# Patient Record
Sex: MALE | Race: WHITE | Employment: FULL TIME | URBAN - METROPOLITAN AREA
[De-identification: names, ages, dates, MRNs, and addresses within clinical notes are randomized per-mention and may not be internally consistent; named-entity substitution may affect disease eponyms.]

---

## 2021-05-05 ENCOUNTER — HOSPITAL ENCOUNTER (OUTPATIENT)
Age: 51
Discharge: HOME | End: 2021-05-05
Attending: PHYSICIAN ASSISTANT
Payer: COMMERCIAL

## 2021-05-05 ENCOUNTER — APPOINTMENT (OUTPATIENT)
Dept: GENERAL RADIOLOGY | Age: 51
End: 2021-05-05
Attending: PHYSICIAN ASSISTANT
Payer: COMMERCIAL

## 2021-05-05 VITALS
HEIGHT: 71 IN | TEMPERATURE: 98 F | BODY MASS INDEX: 25.9 KG/M2 | RESPIRATION RATE: 18 BRPM | DIASTOLIC BLOOD PRESSURE: 82 MMHG | OXYGEN SATURATION: 99 % | WEIGHT: 185 LBS | HEART RATE: 87 BPM | SYSTOLIC BLOOD PRESSURE: 152 MMHG

## 2021-05-05 DIAGNOSIS — R03.0 ELEVATED BLOOD PRESSURE READING: ICD-10-CM

## 2021-05-05 DIAGNOSIS — S62.307A CLOSED DISPLACED FRACTURE OF FIFTH METACARPAL BONE OF LEFT HAND, UNSPECIFIED PORTION OF METACARPAL, INITIAL ENCOUNTER: Primary | ICD-10-CM

## 2021-05-05 PROCEDURE — 99203 OFFICE O/P NEW LOW 30 MIN: CPT

## 2021-05-05 PROCEDURE — 73130 X-RAY EXAM OF HAND: CPT | Performed by: PHYSICIAN ASSISTANT

## 2021-05-05 PROCEDURE — 29125 APPL SHORT ARM SPLINT STATIC: CPT

## 2021-05-05 RX ORDER — HYDROCODONE BITARTRATE AND ACETAMINOPHEN 5; 325 MG/1; MG/1
1 TABLET ORAL EVERY 6 HOURS PRN
Qty: 12 TABLET | Refills: 0 | Status: SHIPPED | OUTPATIENT
Start: 2021-05-05

## 2021-05-05 RX ORDER — IBUPROFEN 600 MG/1
TABLET ORAL
Qty: 20 TABLET | Refills: 0 | Status: SHIPPED | OUTPATIENT
Start: 2021-05-05

## 2021-05-05 NOTE — ED PROVIDER NOTES
Metacarpal  Patient Seen in: [facility]    History   Patient presents with:  Hand Pain    Stated Complaint: left hand possibly fractured/broken    HPI    HPI: Anitra Mix is a 49year old male who presents with chief complaint of left ibrahim is cooperative. Appears well-developed and well-nourished. Mild discomfort. Psychological: Alert, No abnormalities of mood, affect. Head: Normocephalic/atraumatic. Eyes: Pupils are equal round reactive to light. Conjunctiva are within normal limits. shaft of the left 5th metacarpal.     Dictated by (CST): Darshana Steinberg MD on 5/05/2021 at 2:36 PM     Finalized by (CST): Darshana Steinberg MD on 5/05/2021 at 2:38 PM            X-ray images of left hand reviewed by this provider–positive fracture of food  Qty: 20 tablet Refills: 0    HYDROcodone-acetaminophen 5-325 MG Oral Tab  Take 1 tablet by mouth every 6 (six) hours as needed for Pain (Severe pain).   Qty: 12 tablet Refills: 0  Comments: Masha Le MD

## 2021-05-05 NOTE — ED INITIAL ASSESSMENT (HPI)
Pt presents to the IC with c/o left hand pain since last night when he punched a door. +swelling and bruising. Ice applied last night. Medicated with motrin this morning.

## 2022-09-17 ENCOUNTER — HOSPITAL ENCOUNTER (OUTPATIENT)
Age: 52
Discharge: HOME | End: 2022-09-17
Attending: EMERGENCY MEDICINE

## 2022-09-17 VITALS
RESPIRATION RATE: 18 BRPM | TEMPERATURE: 98 F | SYSTOLIC BLOOD PRESSURE: 134 MMHG | OXYGEN SATURATION: 98 % | HEART RATE: 86 BPM | DIASTOLIC BLOOD PRESSURE: 84 MMHG

## 2022-09-17 DIAGNOSIS — T14.8XXA OPEN WOUND: Primary | ICD-10-CM

## 2022-09-17 PROCEDURE — 99213 OFFICE O/P EST LOW 20 MIN: CPT

## 2022-09-17 RX ORDER — CEPHALEXIN 500 MG/1
500 CAPSULE ORAL 3 TIMES DAILY
Qty: 21 CAPSULE | Refills: 0 | Status: SHIPPED | OUTPATIENT
Start: 2022-09-17 | End: 2022-09-27

## 2022-09-17 NOTE — ED INITIAL ASSESSMENT (HPI)
Pt comes in for eval of laceration to L knee. Reports laceration happened and stitched up on 9/1. Stitches removed this past Thursday. Pt comes in for eval as would edges not fully healed. Denies fever, drainage.

## 2024-07-24 ENCOUNTER — HOSPITAL ENCOUNTER (OUTPATIENT)
Age: 54
Discharge: HOME | End: 2024-07-24
Payer: MEDICAID

## 2024-07-24 VITALS
SYSTOLIC BLOOD PRESSURE: 128 MMHG | TEMPERATURE: 98 F | HEART RATE: 70 BPM | RESPIRATION RATE: 22 BRPM | DIASTOLIC BLOOD PRESSURE: 86 MMHG | OXYGEN SATURATION: 99 %

## 2024-07-24 DIAGNOSIS — M79.622 LEFT UPPER ARM PAIN: Primary | ICD-10-CM

## 2024-07-24 PROCEDURE — 99213 OFFICE O/P EST LOW 20 MIN: CPT

## 2024-07-24 PROCEDURE — 99212 OFFICE O/P EST SF 10 MIN: CPT

## 2024-07-24 NOTE — ED INITIAL ASSESSMENT (HPI)
Pt states 2 weeks ago he was moving a heavy desk when he felt a pop and his left bicep balled up. Pt states he was seen at a different urgent care where they did xrays which were fine. Pt states this weekend he was fishing with his kids and when he went to set the hook his bicep balled up again and is causing pain.

## 2024-07-24 NOTE — ED PROVIDER NOTES
Patient Seen in: [facility]      History     Chief Complaint   Patient presents with    Arm or Hand Injury     Stated Complaint: arm injury    Subjective:   HPI    This is a 54-year-old male presenting with a arm injury.  Patient states 2 weeks ago he was moving a heavy desk and felt a pop in his left bicep.  Patient states a few days after that he went to a different urgent care they did x-rays and no injury to the bone.  Patient states that it was doing okay and then this weekend went fishing with the kids and went to set the hook and felt pain and the left biceps but no pop.  Patient states he is able to move it there is pain when he fully extends.  Patient states he was given a sling at the other immediate care that he was at but has not really been wearing it.  Denies any numbness or tingling in the extremity.  Denies falling on the upper extremity.  Denies any swelling.  Right-hand-dominant    Objective:   No pertinent past medical history.            No pertinent past surgical history.              No pertinent social history.            Review of Systems    Positive for stated Chief Complaint: Arm or Hand Injury    Other systems are as noted in HPI.  Constitutional and vital signs reviewed.      All other systems reviewed and negative except as noted above.    Physical Exam     ED Triage Vitals [07/24/24 1050]   /86   Pulse 70   Resp 22   Temp 97.8 °F (36.6 °C)   Temp src Temporal   SpO2 99 %   O2 Device None (Room air)       Current Vitals:   Vital Signs  BP: 128/86  Pulse: 70  Resp: 22  Temp: 97.8 °F (36.6 °C)  Temp src: Temporal    Oxygen Therapy  SpO2: 99 %  O2 Device: None (Room air)            Physical Exam  Vitals and nursing note reviewed.   Constitutional:       Appearance: Normal appearance.   HENT:      Right Ear: External ear normal.      Left Ear: External ear normal.      Nose: Nose normal.      Mouth/Throat:      Mouth: Mucous membranes are moist.      Pharynx: Oropharynx  is clear.   Eyes:      Conjunctiva/sclera: Conjunctivae normal.   Musculoskeletal:         General: Normal range of motion.        Arms:       Cervical back: Normal range of motion.   Skin:     General: Skin is warm.      Capillary Refill: Capillary refill takes less than 2 seconds.   Neurological:      General: No focal deficit present.      Mental Status: He is alert and oriented to person, place, and time.               ED Course   Labs Reviewed - No data to display               MDM                                Medical Decision Making  54-year-old male well-appearing and nontoxic presenting with left biceps injury.  DDx biceps rupture versus biceps tear versus biceps strain.  No clinical indication for any labs or imaging as he did not fall on the extremity.  Discussed possible diagnosis with the patient and following up with Ortho discussed wearing the sling that he was given at the other immediate care center for comfort discussed RICE therapy over-the-counter ibuprofen and Tylenol for pain and ER precautions with any new or worsening symptoms.  Patient feels comfortable with the plan of care and acknowledges understanding discharge instructions.     Risk  OTC drugs.        Disposition and Plan     Clinical Impression:  1. Left upper arm pain         Disposition:  Discharge  7/24/2024 11:05 am    Follow-up:  Karlee Cain PA-C  [address] [place] [id number]  [phone]    Call   Resource for orthopedic specialist to follow-up with call take first available appointment          Medications Prescribed:  Discharge Medication List as of 7/24/2024 11:05 AM

## 2024-07-24 NOTE — DISCHARGE INSTRUCTIONS
Wear the sling that you have at home throughout the day for comfort no heavy lifting pushing or pulling or abrupt movements ice pack to the area 2-3 times per day inside of a pillowcase or cloth take over-the-counter ibuprofen and Tylenol for pain.  If you develop severe swelling inability to move the upper extremity fingertips are blue and not pink cannot feel sensation of you are someone else is touching the fingertips or any new or worsening symptoms go to the nearest emergency department.

## 2024-07-29 ENCOUNTER — OFFICE VISIT (OUTPATIENT)
Dept: ORTHOPEDICS CLINIC | Facility: CLINIC | Age: 54
End: 2024-07-29

## 2024-07-29 ENCOUNTER — TELEPHONE (OUTPATIENT)
Dept: ORTHOPEDICS CLINIC | Facility: CLINIC | Age: 54
End: 2024-07-29

## 2024-07-29 DIAGNOSIS — S46.212A RUPTURE OF LEFT DISTAL BICEPS TENDON, INITIAL ENCOUNTER: Primary | ICD-10-CM

## 2024-07-29 RX ORDER — METHOCARBAMOL 500 MG/1
500 TABLET, FILM COATED ORAL 3 TIMES DAILY PRN
Qty: 30 TABLET | Refills: 0 | Status: SHIPPED | OUTPATIENT
Start: 2024-07-29

## 2024-07-29 NOTE — H&P (VIEW-ONLY)
Orthopaedic Surgery New Patient Visit  _____________________________________________________________________________________________________  _____________________________________________________________________________________________________    DATE OF VISIT: 7/29/2024     CHIEF COMPLAINT:   Chief Complaint   Patient presents with    Arm Pain     Consult  Left arm bicep pain 0-10/10 worse on certain movement. Onset 2 weeks  he move a heavy desk, he heard a pop sound. He was resting his arm, but a 2nd injury was on 07/21/24 he was fishing and did a sudden movement witch cause severe pain.        HISTORY OF PRESENT ILLNESS: Charbel Olvera is a 54 year old male who presents to the clinic for a left biceps injury.  He reports that he was lifting a heavy desk approximately 2 weeks ago when he heard a pop and had substantial amount of pain in his arm about the antecubital fossa.  He noticed immediate deformity about the distal biceps.  He then reports that he had a repeat injury while fishing, resulting in another painful pop in the brought him to his knees.  Since that time, he has had diminished function in his left upper extremity and a notable deformity.  He denies any neurologic symptoms.  He reports the pain varies between 0 out of 10 at rest to 10 out of 10 with certain movements.  Pain is always in the antecubital fossa and distal biceps region.  He denies any other major injuries.  He denies any antecedent symptoms.  He denies any history of urinary tract infection treated with antibiotics.  He is right-hand dominant    SOCIAL HISTORY  Social History     Socioeconomic History    Marital status:      Spouse name: Not on file    Number of children: Not on file    Years of education: Not on file    Highest education level: Not on file   Occupational History    Not on file   Tobacco Use    Smoking status: Never    Smokeless tobacco: Never   Substance and Sexual Activity    Alcohol use: Not on file    Drug  use: Not on file    Sexual activity: Not on file   Other Topics Concern    Not on file   Social History Narrative    Not on file     Social Determinants of Health     Financial Resource Strain: Not on file   Food Insecurity: Not on file   Transportation Needs: Not on file   Physical Activity: Not on file   Stress: Not on file   Social Connections: Not on file   Housing Stability: At Risk (8/18/2023)    Received from [facility]     Living Situation: Not on file     Housing Problems: Not on file        PAST MEDICAL HISTORY  History reviewed. No pertinent past medical history.     PAST SURGICAL HISTORY  History reviewed. No pertinent surgical history.     MEDICATIONS  * Reviewed   Acetaminophen 500 MG Oral Cap Take 2 capsules (1,000 mg total) by mouth every 8 (eight) hours as needed for Pain. Never exceed 3000 mg in a 24-hour period.  Many over-the-counter medications also have acetaminophen in them. 60 capsule 0    methocarbamol 500 MG Oral Tab Take 1 tablet (500 mg total) by mouth 3 (three) times daily as needed (Muscle pain). 30 tablet 0        ALLERGIES  No Known Allergies     FAMILY HISTORY  History reviewed. No pertinent family history.     REVIEW OF SYSTEMS  A 14 point review of systems was performed. Pertinent positives and negatives noted in the HPI.    PHYSICAL EXAM  There were no vitals taken for this visit.     Constitutional: The patient is well-developed, well-nourished, in no acute distress.  Neurological: Alert and oriented to person, place, and time.  Psychiatric: Mood and affect normal.  Head: Normocephalic and atraumatic.  Cardiovascular: regular rate by palpation  Pulmonary/Chest: Effort normal. No respiratory distress. Breathing non-labored  Abdominal: Abdomen exhibits no distension.   Left  Elbow  Inspection/Palpation  Visible Tito deformity  Mild ecchymosis in antecubital fossa  Mild effusion   Focally tender to palpation to antecubital fossa  ROM  Elbow Flexion: 140  degrees  Elbow Extension: 0 degrees  Pronation: 80 degrees  Supination: 80 degree  Motor/Strength  Motor intact to AIN/PIN/U distributions  Flexion: 4/5  Extension: 5/5  Pronation: 5/5  Supination: 4/5  Stability  Stable to varus stress   Negative posterolateral rotary drawer  Able to push-up from chair  Stable to valgus stress  Negative milking maneuver  Negative moving valgus stress test  POSITIVE Tests  Biceps:  Abnormal Hook test and Absent Biceps squeeze  NEGATIVE Tests  Cubital Tunnel: Ulnar nerve subluxation and Froment sign  SILT R/U/M/MABCN/LABCN  Radial pulse 2+, distally warm and well perfused, brisk capillary refill      RESULTS    No results found for: \"WBC\", \"HGB\", \"PLT\"   No results found for: \"GLU\", \"BUN\", \"CREATSERUM\", \"GFR\", \"GFRNAA\", \"GFRAA\"     IMAGING  I independently viewed and interpreted the imaging. Radiologist interpretation is available in the imaging report.  X-ray: Plain films of the left elbow including AP, lateral, and oblique views were reviewed. These demonstrate no acute ossoues abnormalities. The ulnohumeral and radiocapitellar joints are well aligned with minimal to no degenerative changes appreciated.     ASSESSMENT/PLAN: Charbel Olvera is a 54 year old male who presents to the Orthopaedic surgery clinic today for left arm injury. Based on history, physical exam, and available imaging, the patient diagnosis is consistent with distal biceps rupture. The management options for this process were discussed with the patient to include conservative and surgical options. Given the patient's age, functional status, and findings, I recommend repair.      - General risks of surgery reviewed including risks of pain, bleeding, infection, scarring, damage to surrounding structures, need for further procedures, blood transfusion, VTE, risks of anesthesia, failure to improve symptoms, and recurrence of disease.    -  Patient will be placed on ERAS pathway. Written and verbal pre-op  instructions given, including information regarding pre-op diet, utilization of chlorhexidine, and expectations for post-op activity. Discussed expected course of recovery and post-op activity restrictions.  - Discussed discharge pathway.  - Discussed postoperative pain management and expectation that patient may require narcotic medication as an adjunct to multi-modal analgesic therapy.  - All questions were answered and patient desires to proceed.   - Plan to sign consents on the day of surgery.   - Preoperative testing needed:  None  - See below for specific surgical planning details:      Surgical Planning:  Procedure: Left distal biceps repair  PMH considerations: Noncontributory  PSH considerations: Noncontributory  Drug Allergies: NKDA  Anesthesia issues:  no FHx or personal problems with anesthesia  Planned positioning: Supine with hand table  Bleeding Issues:  no personal of FHx of bleeding or clotting problems   Pre-op consultations needed: None  Additional pre-op imaging needed: None  Counseled for smoking cessation to improve OR outcome: N/A  Special equipment needed in the operating room: Distal biceps repair set, mini C arm  Post-op pain regimen:  icing, elevation, Tylenol, celebrex, gabapentin, methocarbamol, oxyodone  Informed Consent: to be signed on day of surgery  People to be present: Spouse  Person to make decisions if patient is incapacitated: Spouse    I have personally seen Charbel Olvera and discussed in detail their plan of care. Prior to departure, they indicated agreement with and understanding of their plan of care and their follow-up as documented herein this note. Please note that this note was written in combination with voice recognition/dictation software and there is a possibility of transcription errors which were not identified at the time of note submission. If clarification is necessary, please contact the author or clinic staff.    Jean-Paul Dc MD  Orthopaedic  Surgery  7/29/2024      Rationale for 57 Modifier Addendum    Decision for Major Surgery  The decision for a major surgery was made during this visit/consultation based on review and discussion of the history of presentation, examination, available labs/imaging, and the patient's functional status. The medical and surgical history were considered with regards to risk and potential modifications needed.

## 2024-07-29 NOTE — TELEPHONE ENCOUNTER
Ortho Surgery Request  Surgery: Left distal biceps repair      Surgical Assistant: Malik Tucker  Surgery Day Request: 14, 15, or 16 August in the afternoon  Estimated Procedure Length: 1 hour  Anesthesia type: General   Position: Supine with hand table   Special Needs:[x]Mini C-Arm    Equipment: Arthrex biceps anchors   Location:[facility]  Post Op Visit Date: 2 weeks  CPT Code: 51066  ICD Code:S46.212  Medical Clearance Needed: NA  Preadmission Testing Orders:  Anesthesia testing protocols and anesthesia pre op orders will be used  Additional PAT orders:  Pre OP Orders:  Use [facility] procedure driven order set in addition to anesthesia protocol  Additional Pre Op Orders:  PCN Allergy:  No  If Yes:   __X__  Admit:  Hospital outpatient surgery

## 2024-07-29 NOTE — PROGRESS NOTES
Orthopaedic Surgery New Patient Visit  _____________________________________________________________________________________________________  _____________________________________________________________________________________________________    DATE OF VISIT: 7/29/2024     CHIEF COMPLAINT:   Chief Complaint   Patient presents with    Arm Pain     Consult  Left arm bicep pain 0-10/10 worse on certain movement. Onset 2 weeks  he move a heavy desk, he heard a pop sound. He was resting his arm, but a 2nd injury was on 07/21/24 he was fishing and did a sudden movement witch cause severe pain.        HISTORY OF PRESENT ILLNESS: Charbel Olvera is a 54 year old male who presents to the clinic for a left biceps injury.  He reports that he was lifting a heavy desk approximately 2 weeks ago when he heard a pop and had substantial amount of pain in his arm about the antecubital fossa.  He noticed immediate deformity about the distal biceps.  He then reports that he had a repeat injury while fishing, resulting in another painful pop in the brought him to his knees.  Since that time, he has had diminished function in his left upper extremity and a notable deformity.  He denies any neurologic symptoms.  He reports the pain varies between 0 out of 10 at rest to 10 out of 10 with certain movements.  Pain is always in the antecubital fossa and distal biceps region.  He denies any other major injuries.  He denies any antecedent symptoms.  He denies any history of urinary tract infection treated with antibiotics.  He is right-hand dominant    SOCIAL HISTORY  Social History     Socioeconomic History    Marital status:      Spouse name: Not on file    Number of children: Not on file    Years of education: Not on file    Highest education level: Not on file   Occupational History    Not on file   Tobacco Use    Smoking status: Never    Smokeless tobacco: Never   Substance and Sexual Activity    Alcohol use: Not on file    Drug  use: Not on file    Sexual activity: Not on file   Other Topics Concern    Not on file   Social History Narrative    Not on file     Social Determinants of Health     Financial Resource Strain: Not on file   Food Insecurity: Not on file   Transportation Needs: Not on file   Physical Activity: Not on file   Stress: Not on file   Social Connections: Not on file   Housing Stability: At Risk (8/18/2023)    Received from [facility]     Living Situation: Not on file     Housing Problems: Not on file        PAST MEDICAL HISTORY  History reviewed. No pertinent past medical history.     PAST SURGICAL HISTORY  History reviewed. No pertinent surgical history.     MEDICATIONS  * Reviewed   Acetaminophen 500 MG Oral Cap Take 2 capsules (1,000 mg total) by mouth every 8 (eight) hours as needed for Pain. Never exceed 3000 mg in a 24-hour period.  Many over-the-counter medications also have acetaminophen in them. 60 capsule 0    methocarbamol 500 MG Oral Tab Take 1 tablet (500 mg total) by mouth 3 (three) times daily as needed (Muscle pain). 30 tablet 0        ALLERGIES  No Known Allergies     FAMILY HISTORY  History reviewed. No pertinent family history.     REVIEW OF SYSTEMS  A 14 point review of systems was performed. Pertinent positives and negatives noted in the HPI.    PHYSICAL EXAM  There were no vitals taken for this visit.     Constitutional: The patient is well-developed, well-nourished, in no acute distress.  Neurological: Alert and oriented to person, place, and time.  Psychiatric: Mood and affect normal.  Head: Normocephalic and atraumatic.  Cardiovascular: regular rate by palpation  Pulmonary/Chest: Effort normal. No respiratory distress. Breathing non-labored  Abdominal: Abdomen exhibits no distension.   Left  Elbow  Inspection/Palpation  Visible Tito deformity  Mild ecchymosis in antecubital fossa  Mild effusion   Focally tender to palpation to antecubital fossa  ROM  Elbow Flexion: 140  degrees  Elbow Extension: 0 degrees  Pronation: 80 degrees  Supination: 80 degree  Motor/Strength  Motor intact to AIN/PIN/U distributions  Flexion: 4/5  Extension: 5/5  Pronation: 5/5  Supination: 4/5  Stability  Stable to varus stress   Negative posterolateral rotary drawer  Able to push-up from chair  Stable to valgus stress  Negative milking maneuver  Negative moving valgus stress test  POSITIVE Tests  Biceps:  Abnormal Hook test and Absent Biceps squeeze  NEGATIVE Tests  Cubital Tunnel: Ulnar nerve subluxation and Froment sign  SILT R/U/M/MABCN/LABCN  Radial pulse 2+, distally warm and well perfused, brisk capillary refill      RESULTS    No results found for: \"WBC\", \"HGB\", \"PLT\"   No results found for: \"GLU\", \"BUN\", \"CREATSERUM\", \"GFR\", \"GFRNAA\", \"GFRAA\"     IMAGING  I independently viewed and interpreted the imaging. Radiologist interpretation is available in the imaging report.  X-ray: Plain films of the left elbow including AP, lateral, and oblique views were reviewed. These demonstrate no acute ossoues abnormalities. The ulnohumeral and radiocapitellar joints are well aligned with minimal to no degenerative changes appreciated.     ASSESSMENT/PLAN: Charbel Olvera is a 54 year old male who presents to the Orthopaedic surgery clinic today for left arm injury. Based on history, physical exam, and available imaging, the patient diagnosis is consistent with distal biceps rupture. The management options for this process were discussed with the patient to include conservative and surgical options. Given the patient's age, functional status, and findings, I recommend repair.      - General risks of surgery reviewed including risks of pain, bleeding, infection, scarring, damage to surrounding structures, need for further procedures, blood transfusion, VTE, risks of anesthesia, failure to improve symptoms, and recurrence of disease.    -  Patient will be placed on ERAS pathway. Written and verbal pre-op  instructions given, including information regarding pre-op diet, utilization of chlorhexidine, and expectations for post-op activity. Discussed expected course of recovery and post-op activity restrictions.  - Discussed discharge pathway.  - Discussed postoperative pain management and expectation that patient may require narcotic medication as an adjunct to multi-modal analgesic therapy.  - All questions were answered and patient desires to proceed.   - Plan to sign consents on the day of surgery.   - Preoperative testing needed:  None  - See below for specific surgical planning details:      Surgical Planning:  Procedure: Left distal biceps repair  PMH considerations: Noncontributory  PSH considerations: Noncontributory  Drug Allergies: NKDA  Anesthesia issues:  no FHx or personal problems with anesthesia  Planned positioning: Supine with hand table  Bleeding Issues:  no personal of FHx of bleeding or clotting problems   Pre-op consultations needed: None  Additional pre-op imaging needed: None  Counseled for smoking cessation to improve OR outcome: N/A  Special equipment needed in the operating room: Distal biceps repair set, mini C arm  Post-op pain regimen:  icing, elevation, Tylenol, celebrex, gabapentin, methocarbamol, oxyodone  Informed Consent: to be signed on day of surgery  People to be present: Spouse  Person to make decisions if patient is incapacitated: Spouse    I have personally seen Charbel Olvera and discussed in detail their plan of care. Prior to departure, they indicated agreement with and understanding of their plan of care and their follow-up as documented herein this note. Please note that this note was written in combination with voice recognition/dictation software and there is a possibility of transcription errors which were not identified at the time of note submission. If clarification is necessary, please contact the author or clinic staff.    Jean-Paul Dc MD  Orthopaedic  Surgery  7/29/2024      Rationale for 57 Modifier Addendum    Decision for Major Surgery  The decision for a major surgery was made during this visit/consultation based on review and discussion of the history of presentation, examination, available labs/imaging, and the patient's functional status. The medical and surgical history were considered with regards to risk and potential modifications needed.

## 2024-07-29 NOTE — TELEPHONE ENCOUNTER
Type of surgery:  Left Distal Biceps Repair  Date: 8/15/24  Location: [facility]  Medical Clearance: No     *Medical:      *Dental:      *Other:  Prior Authorization Status: Pending   Workers Comp:  Medacta/Jesus Manuel:  [place]: Yes  POV: 8/28/24

## 2024-07-29 NOTE — TELEPHONE ENCOUNTER
Spoke with patient to inform him that his surgery with Dr. Dc will be scheduled for 8/15. Patient agreed to this surgical date.

## 2024-07-29 NOTE — TELEPHONE ENCOUNTER
Called Pt. Regarding left arm X-rays. NO answer left detailed message, If patient has any Xrays, MRI or discs pertaining to his left arm to bring them in. We will be putting in an order if he does not have any Xrays.

## 2024-08-15 ENCOUNTER — ANESTHESIA EVENT (OUTPATIENT)
Dept: SURGERY | Facility: HOSPITAL | Age: 54
End: 2024-08-15
Payer: MEDICAID

## 2024-08-15 ENCOUNTER — HOSPITAL ENCOUNTER (OUTPATIENT)
Facility: HOSPITAL | Age: 54
Discharge: HOME | End: 2024-08-15
Attending: STUDENT IN AN ORGANIZED HEALTH CARE EDUCATION/TRAINING PROGRAM | Admitting: STUDENT IN AN ORGANIZED HEALTH CARE EDUCATION/TRAINING PROGRAM
Payer: MEDICAID

## 2024-08-15 ENCOUNTER — ANESTHESIA (OUTPATIENT)
Dept: SURGERY | Facility: HOSPITAL | Age: 54
End: 2024-08-15
Payer: MEDICAID

## 2024-08-15 ENCOUNTER — APPOINTMENT (OUTPATIENT)
Dept: GENERAL RADIOLOGY | Facility: HOSPITAL | Age: 54
End: 2024-08-15
Attending: STUDENT IN AN ORGANIZED HEALTH CARE EDUCATION/TRAINING PROGRAM
Payer: MEDICAID

## 2024-08-15 VITALS
TEMPERATURE: 97 F | SYSTOLIC BLOOD PRESSURE: 140 MMHG | RESPIRATION RATE: 14 BRPM | HEIGHT: 71 IN | DIASTOLIC BLOOD PRESSURE: 80 MMHG | HEART RATE: 56 BPM | OXYGEN SATURATION: 98 % | BODY MASS INDEX: 24.5 KG/M2 | WEIGHT: 175 LBS

## 2024-08-15 DIAGNOSIS — S46.212A RUPTURE OF LEFT DISTAL BICEPS TENDON, INITIAL ENCOUNTER: Primary | ICD-10-CM

## 2024-08-15 PROCEDURE — 24342 REPAIR OF RUPTURED TENDON: CPT | Performed by: STUDENT IN AN ORGANIZED HEALTH CARE EDUCATION/TRAINING PROGRAM

## 2024-08-15 PROCEDURE — 76000 FLUOROSCOPY <1 HR PHYS/QHP: CPT | Performed by: STUDENT IN AN ORGANIZED HEALTH CARE EDUCATION/TRAINING PROGRAM

## 2024-08-15 PROCEDURE — 0LM40ZZ REATTACHMENT OF LEFT UPPER ARM TENDON, OPEN APPROACH: ICD-10-PCS | Performed by: STUDENT IN AN ORGANIZED HEALTH CARE EDUCATION/TRAINING PROGRAM

## 2024-08-15 DEVICE — FIBERTAK BICEPS IMPLANT SET
Type: IMPLANTABLE DEVICE | Site: ARM | Status: FUNCTIONAL
Brand: ARTHREX®

## 2024-08-15 DEVICE — IMPLANT SYSTEM, FIBERTAK BUTTON
Type: IMPLANTABLE DEVICE | Site: ARM | Status: FUNCTIONAL
Brand: ARTHREX®

## 2024-08-15 RX ORDER — ONDANSETRON 2 MG/ML
INJECTION INTRAMUSCULAR; INTRAVENOUS AS NEEDED
Status: DISCONTINUED | OUTPATIENT
Start: 2024-08-15 | End: 2024-08-15 | Stop reason: SURG

## 2024-08-15 RX ORDER — MORPHINE SULFATE 10 MG/ML
6 INJECTION, SOLUTION INTRAMUSCULAR; INTRAVENOUS EVERY 10 MIN PRN
Status: DISCONTINUED | OUTPATIENT
Start: 2024-08-15 | End: 2024-08-15

## 2024-08-15 RX ORDER — HYDROMORPHONE HYDROCHLORIDE 1 MG/ML
0.4 INJECTION, SOLUTION INTRAMUSCULAR; INTRAVENOUS; SUBCUTANEOUS EVERY 2 HOUR PRN
Status: DISCONTINUED | OUTPATIENT
Start: 2024-08-15 | End: 2024-08-15

## 2024-08-15 RX ORDER — ONDANSETRON 4 MG/1
4 TABLET, ORALLY DISINTEGRATING ORAL EVERY 8 HOURS PRN
Qty: 10 TABLET | Refills: 0 | Status: SHIPPED | OUTPATIENT
Start: 2024-08-15

## 2024-08-15 RX ORDER — METHOCARBAMOL 750 MG/1
750 TABLET, FILM COATED ORAL 3 TIMES DAILY PRN
Qty: 42 TABLET | Refills: 0 | Status: SHIPPED | OUTPATIENT
Start: 2024-08-15

## 2024-08-15 RX ORDER — HYDROMORPHONE HYDROCHLORIDE 1 MG/ML
0.6 INJECTION, SOLUTION INTRAMUSCULAR; INTRAVENOUS; SUBCUTANEOUS EVERY 5 MIN PRN
Status: DISCONTINUED | OUTPATIENT
Start: 2024-08-15 | End: 2024-08-15

## 2024-08-15 RX ORDER — MORPHINE SULFATE 4 MG/ML
4 INJECTION, SOLUTION INTRAMUSCULAR; INTRAVENOUS EVERY 10 MIN PRN
Status: DISCONTINUED | OUTPATIENT
Start: 2024-08-15 | End: 2024-08-15

## 2024-08-15 RX ORDER — ROCURONIUM BROMIDE 10 MG/ML
INJECTION, SOLUTION INTRAVENOUS AS NEEDED
Status: DISCONTINUED | OUTPATIENT
Start: 2024-08-15 | End: 2024-08-15 | Stop reason: SURG

## 2024-08-15 RX ORDER — ONDANSETRON 2 MG/ML
4 INJECTION INTRAMUSCULAR; INTRAVENOUS EVERY 6 HOURS PRN
Status: DISCONTINUED | OUTPATIENT
Start: 2024-08-15 | End: 2024-08-15

## 2024-08-15 RX ORDER — HYDROMORPHONE HYDROCHLORIDE 1 MG/ML
0.4 INJECTION, SOLUTION INTRAMUSCULAR; INTRAVENOUS; SUBCUTANEOUS EVERY 5 MIN PRN
Status: DISCONTINUED | OUTPATIENT
Start: 2024-08-15 | End: 2024-08-15

## 2024-08-15 RX ORDER — SODIUM CHLORIDE, SODIUM LACTATE, POTASSIUM CHLORIDE, CALCIUM CHLORIDE 600; 310; 30; 20 MG/100ML; MG/100ML; MG/100ML; MG/100ML
INJECTION, SOLUTION INTRAVENOUS CONTINUOUS
Status: DISCONTINUED | OUTPATIENT
Start: 2024-08-15 | End: 2024-08-15

## 2024-08-15 RX ORDER — ACETAMINOPHEN 500 MG
1000 TABLET ORAL ONCE
Status: COMPLETED | OUTPATIENT
Start: 2024-08-15 | End: 2024-08-15

## 2024-08-15 RX ORDER — HYDROMORPHONE HYDROCHLORIDE 1 MG/ML
0.2 INJECTION, SOLUTION INTRAMUSCULAR; INTRAVENOUS; SUBCUTANEOUS EVERY 5 MIN PRN
Status: DISCONTINUED | OUTPATIENT
Start: 2024-08-15 | End: 2024-08-15

## 2024-08-15 RX ORDER — ACETAMINOPHEN 500 MG
1000 TABLET ORAL EVERY 8 HOURS SCHEDULED
Status: DISCONTINUED | OUTPATIENT
Start: 2024-08-15 | End: 2024-08-15

## 2024-08-15 RX ORDER — MORPHINE SULFATE 4 MG/ML
2 INJECTION, SOLUTION INTRAMUSCULAR; INTRAVENOUS EVERY 10 MIN PRN
Status: DISCONTINUED | OUTPATIENT
Start: 2024-08-15 | End: 2024-08-15

## 2024-08-15 RX ORDER — OXYCODONE HYDROCHLORIDE 5 MG/1
10 TABLET ORAL EVERY 4 HOURS PRN
Status: DISCONTINUED | OUTPATIENT
Start: 2024-08-15 | End: 2024-08-15

## 2024-08-15 RX ORDER — HYDROMORPHONE HYDROCHLORIDE 1 MG/ML
0.8 INJECTION, SOLUTION INTRAMUSCULAR; INTRAVENOUS; SUBCUTANEOUS EVERY 2 HOUR PRN
Status: DISCONTINUED | OUTPATIENT
Start: 2024-08-15 | End: 2024-08-15

## 2024-08-15 RX ORDER — OXYCODONE HYDROCHLORIDE 5 MG/1
5 TABLET ORAL EVERY 4 HOURS PRN
Qty: 12 TABLET | Refills: 0 | Status: SHIPPED | OUTPATIENT
Start: 2024-08-15

## 2024-08-15 RX ORDER — CELECOXIB 200 MG/1
200 CAPSULE ORAL 2 TIMES DAILY
Status: DISCONTINUED | OUTPATIENT
Start: 2024-08-15 | End: 2024-08-15

## 2024-08-15 RX ORDER — LIDOCAINE HYDROCHLORIDE 10 MG/ML
INJECTION, SOLUTION EPIDURAL; INFILTRATION; INTRACAUDAL; PERINEURAL AS NEEDED
Status: DISCONTINUED | OUTPATIENT
Start: 2024-08-15 | End: 2024-08-15 | Stop reason: SURG

## 2024-08-15 RX ORDER — HYDROMORPHONE HYDROCHLORIDE 1 MG/ML
INJECTION, SOLUTION INTRAMUSCULAR; INTRAVENOUS; SUBCUTANEOUS AS NEEDED
Status: DISCONTINUED | OUTPATIENT
Start: 2024-08-15 | End: 2024-08-15 | Stop reason: SURG

## 2024-08-15 RX ORDER — NALOXONE HYDROCHLORIDE 0.4 MG/ML
0.08 INJECTION, SOLUTION INTRAMUSCULAR; INTRAVENOUS; SUBCUTANEOUS AS NEEDED
Status: DISCONTINUED | OUTPATIENT
Start: 2024-08-15 | End: 2024-08-15

## 2024-08-15 RX ORDER — GABAPENTIN 300 MG/1
300 CAPSULE ORAL EVERY 8 HOURS
Qty: 30 CAPSULE | Refills: 0 | Status: SHIPPED | OUTPATIENT
Start: 2024-08-15

## 2024-08-15 RX ORDER — CELECOXIB 200 MG/1
200 CAPSULE ORAL 2 TIMES DAILY
Qty: 28 CAPSULE | Refills: 0 | Status: SHIPPED | OUTPATIENT
Start: 2024-08-15

## 2024-08-15 RX ORDER — OXYCODONE HYDROCHLORIDE 5 MG/1
5 TABLET ORAL EVERY 4 HOURS PRN
Status: DISCONTINUED | OUTPATIENT
Start: 2024-08-15 | End: 2024-08-15

## 2024-08-15 RX ORDER — BUPIVACAINE HYDROCHLORIDE AND EPINEPHRINE 5; 5 MG/ML; UG/ML
INJECTION, SOLUTION PERINEURAL AS NEEDED
Status: DISCONTINUED | OUTPATIENT
Start: 2024-08-15 | End: 2024-08-15 | Stop reason: HOSPADM

## 2024-08-15 RX ORDER — DEXAMETHASONE SODIUM PHOSPHATE 4 MG/ML
VIAL (ML) INJECTION AS NEEDED
Status: DISCONTINUED | OUTPATIENT
Start: 2024-08-15 | End: 2024-08-15 | Stop reason: SURG

## 2024-08-15 RX ORDER — SENNA AND DOCUSATE SODIUM 50; 8.6 MG/1; MG/1
2 TABLET, FILM COATED ORAL NIGHTLY
Qty: 28 TABLET | Refills: 0 | Status: SHIPPED | OUTPATIENT
Start: 2024-08-15

## 2024-08-15 RX ADMIN — SODIUM CHLORIDE, SODIUM LACTATE, POTASSIUM CHLORIDE, CALCIUM CHLORIDE: 600; 310; 30; 20 INJECTION, SOLUTION INTRAVENOUS at 15:28:00

## 2024-08-15 RX ADMIN — LIDOCAINE HYDROCHLORIDE 50 MG: 10 INJECTION, SOLUTION EPIDURAL; INFILTRATION; INTRACAUDAL; PERINEURAL at 14:04:00

## 2024-08-15 RX ADMIN — HYDROMORPHONE HYDROCHLORIDE 0.5 MG: 1 INJECTION, SOLUTION INTRAMUSCULAR; INTRAVENOUS; SUBCUTANEOUS at 14:46:00

## 2024-08-15 RX ADMIN — ROCURONIUM BROMIDE 50 MG: 10 INJECTION, SOLUTION INTRAVENOUS at 14:04:00

## 2024-08-15 RX ADMIN — ONDANSETRON 4 MG: 2 INJECTION INTRAMUSCULAR; INTRAVENOUS at 14:14:00

## 2024-08-15 RX ADMIN — DEXAMETHASONE SODIUM PHOSPHATE 4 MG: 4 MG/ML VIAL (ML) INJECTION at 14:14:00

## 2024-08-15 NOTE — ANESTHESIA POSTPROCEDURE EVALUATION
Patient: Charbel Olvera    Procedure Summary       Date: 08/15/24 Room / Location: [facility] MAIN OR 08 / [facility] MAIN OR    Anesthesia Start: 1354 Anesthesia Stop: 1530    Procedure: Left distal biceps repair (Left: Upper Arm) Diagnosis:       Rupture of left distal biceps tendon, initial encounter      (Rupture of left distal biceps tendon, initial encounter [S46.212A])    Surgeons: Jean-Paul Dc MD Anesthesiologist: Yoav Donis MD    Anesthesia Type: general ASA Status: 2            Anesthesia Type: general    Vitals Value Taken Time   /93 08/15/24 1630   Temp 97.3 °F (36.3 °C) 08/15/24 1529   Pulse 63 08/15/24 1635   Resp 11 08/15/24 1635   SpO2 95 % 08/15/24 1635   Vitals shown include unfiled device data.    [facility] AN Post Evaluation:   Patient Evaluated in PACU  Patient Participation: complete - patient participated  Level of Consciousness: awake and alert  Pain Management: adequate  Airway Patency:patent  Dental exam unchanged from preop  Yes    Nausea/Vomiting: none  Cardiovascular Status: hemodynamically stable  Respiratory Status: room air  Postoperative Hydration euvolemic      Yoav Donis MD  8/15/2024 4:36 PM

## 2024-08-15 NOTE — DISCHARGE INSTRUCTIONS
Patient Discharge Instructions  Distal Biceps Repair      WEIGHT BEARING: You will be immobilized in a splint for the first 2 weeks after surgery.  You may use the hand to assist with hygiene and to write/type but should limit your weightbearing with the arm to less than a coffee cup.  This will be progressed slowly after your first postoperative visit.    RANGE OF MOTION: Range of Motion of Fingers and Wrist and Shoulder as Tolerated. You may move your extremity as tolerated if not restricted by your splint. Motion is important to avoid post-operative stiffness.  Once your splint is removed at your first postoperative visit, you will be transitioned out of immobilization and into a hinged brace. You should then begin to progress your elbow motion slowly, at approximately 30 degrees every week (0-90 degrees at 2 weeks, 0-120 degrees at 3 weeks, full motion at 4 weeks). You should avoid ACTIVE (motion using your biceps muscle) flexion of the elbow for the first 6 weeks after surgery and then slowly progress this over the following 2 weeks.    HAND AND WRIST MOTION  Begin next day after surgery  Can be performed while arm is in sling  Curl the fingers into the palm to make a tight fist and then straighten them out.  Move the wrist up and down as far as you can tolerate to prevent stiffness.                        PAIN MEDICATIONS:   For many people, post-operative pain can be managed with simple measures, such as elevation of the operative extremity above the level of the heart, cryotherapy and ice, compression, etc. DO NOT UNDERESTIMATE THE IMPORTANCE OF ELEVATION. When your operative site is in a dependent position (below the heart), you will notice significantly more swelling/throbbing/pain.   In addition to these measures, we have prescribed pain medications. To minimize narcotic use and establish better pain control, we employ a multimodal pain approach. This protocol combines the use of scheduled acetaminophen,  gabapentin, and narcotics.  We will often use anti-inflammatories (NSAID's such as ibuprofen, celecoxib, and/or naproxen) to further assist with pain control thus allowing for a lower dose of narcotic to be used. Dosing of medications will vary from patient to patient based on their needs and past medical history, so please refer to your discharge medication list.  Opiate pain medications (oxycodone) will only be refilled after an in-person visit. For all other medication refills, please contact us using the contact information below    You have been prescribed:    Acetaminophen (Tylenol) 500mg - Take 2 tabs by mouth every 8 hours for pain. Do not take more than 4000mg each day. Tylenol should be taken as a first-line, scheduled pain medication. You should only stop taking this once you have no pain or so little pain as to not need any medications. If you have liver problems, please discontinue and notify your surgeon.    Celecoxib (Celebrex) 100mg or 200mg (dosage will be determined by your age, weight, and/or kidney function) - Take 1 tablet by mouth every 12 hours for pain. NSAIDs (Ibuprofen, Naproxen, Celecoxib) should be taken as a first-line pain medication like acetaminophen. NSAIDs are non-steroidal anti-inflammatory medications. They reduce inflammation in the affected area. You should also plan to take this until you have essentially no pain. You may wean down to just celecoxib or just acetaminophen prior to completely discontinuing medicines, and we do not have a strong recommendation of which one to stop last. Please take NSAIDs as prescribed, with food, to avoid stomach ulcers. If you have a history of stomach ulcers or GI bleeding, please discontinue and notify your surgeon.    Methocarbamol (Robaxin) 750mg - Take 1 tablet by mouth every 8 hours as needed for muscle pain and/or muscle spasms after surgery. This medication will help with muscle spasms, which can be particularly painful after  muscle/tendon repair surgery. You may discontinue this medicine when muscle pain is not particularly painful or limiting. You may also opt to take this medicine prior to sleep to help prevent muscle pains from waking you.    Gabapentin 300mg - Take 1 tab by mouth three times daily on a scheduled basis for the first ten days after surgery. This medication helps control nerve sensitivity after surgery. After ten days, we usually discontinue this medication. However, you may continue taking if you find it to be beneficial.     Oxycodone IR 5mg - Take one tablet by mouth as often as every 4 hours as needed for breakthrough pain. Take this medication as your breakthrough pain medication, after maximizing other pain medications. Opiate pain medications (Oxycodone, Hydrocodone, Oxycontin) are prescribed as a second-line pain medication for moderate to severe post-operative pain. Opiates are associated with dependency and addiction if taken for a prolonged period. For this reason, it is best to use opiates ONLY as needed.    Ondansetron (Zofran) 4mg - Dissolve 1 tab under your tongue every 8 hours as needed for nausea and/or vomiting. You do not need to swallow this medication.     Senna-Docusate 8.6mg-50mg - Take 2 tabs by mouth every evening for constipation prevention. Constipation is common after surgery and while taking pain medications. When taking this medication, you should be having a bowel movement every 2-3 days.  If not, then proceed with over-the-counter laxatives (Docusate/Miralax), enemas, or suppositories to fix the constipation.  All of these are over the counter and can be discussed in more detail with your pharmacist. If you are having multiple bowel movements daily, you should discontinue this medication.      Example Medication Schedule  Medication Morning   0600 Mid-Morning   1000 Early Afternoon   1400 Late Afternoon   1800 Evening   2200   Acetaminophen * x  x  x   Celecoxib * x    x   Methocarbamol   x  x  x   Gabapentin ^ x  x  x   Oxycodone  If needed If needed If needed If needed If needed   Ondansetron   If needed  If needed    Senna-Docusate *     x   Ice  x x x x x   *Take as scheduled until essentially no pain (or until loose bowel movements for senna-docusate)  ^Take/use as scheduled until out  If no marking, you should use as needed    ICE PACK/CRYOTHERAPY: Use frequently over the next 7-10 days.  Ice bags/packs/bladder should be used for 20-30 minutes every 3-4 hours during waking hours. Protect your skin from frostbite with a washcloth, towel, or ace wrap between the ice bag/pack and your skin.    DRESSINGS/WOUND CARE:   Do not remove any splint or cast. This will be addressed at your follow-up appointment. If you feel that your cast/splint is too tight or there maybe another issue with it, please call the office to be seen or come to the emergency room.   Please keep your dressings/splint clean and dry. Follow the bathing instructions below.   If you have increased drainage, incision redness, foul smell, or fevers - inform the office.  You may need to be evaluated in the office earlier than your follow-up appointment.    SPLINT/BRACE:   You will be immobilized in a splint at first, and will be transitioned to a hinged elbow brace at your 2 week postoperative visit.  Our clinic does not have these braces readily available so you will be prescribed a brace at your time of discharge. Please pick this up before your first postoperative visit and bring to your first appointment.    PHYSICAL/OCCUPATIONAL THERAPY (PT/OT): To maximize surgical benefit, PT/OT is necessary. If you do not have an appointment, you should contact PT/OT to set up an appointment as soon as possible. If you have problems setting up PT, please contact our nursing team.    BATHING:   You should keep your splint dry (keep out of shower or bath or keep covered with water tight bag with securing bands) until your first postoperative  visit at which time you will have your splint removed and you may begin to shower without issues.   Do not scrub the wound.   Do not soak the wound/incision, use hot tubs or swimming pools, oceans, lakes, ponds until four weeks after surgery.   Following these guidelines will help avoid any wound healing issues and/or infections.    DVT PREVENTION:   Deep vein thrombosis (DVT) or blood clots sometimes occur following surgery. It is important to understand the signs/symptoms and methods to avoid DVTs.   Symptoms of DVT include swelling, throbbing and cramping in the extremity, swollen veins that are hard or sore. DVTs can travel to the lungs and cause a pulmonary embolus (PE) and death. Symptoms of a pulmonary embolus include chest pain and shortness of breath. If you experience any of these symptoms you should contact the clinic immediately and/or go to the nearest emergency room.   To prevent blood clots, you should move your extremities and joints, limited by the restrictions above. Perform foot pumps, ankle circles, leg lifts, etc. to circulate blood in the extremity.   If you have been prescribed Aspirin, please take it as prescribed for DVT prophylaxis. If you plan to travel in a car or plane for long periods (> 1 hour), contact your surgeon regarding the need for DVT prophylaxis. If you have a history of blood clots, and have not been prescribed a medication, contact your surgeon immediately.     DRIVING: Driving after your surgery is dependent on several factors. You may not drive if you are taking opiate pain medications. You may not drive if you're physically unable to steer with 2 hands and press the brake with full force. If you are unsure whether you are safe to drive, you should visit your local DMV. We are not medically or legally responsible for an accident caused by unsafe driving.     POST-OPERATIVE APPOINTMENT: Your first post-operative appointment is scheduled for 10-14 days after the procedure.  If you do not have an appointment scheduled yet, please contact our scheduling office.    SPECIAL INSTRUCTIONS: If you experience fevers greater than 100.4°F on two consecutive measurements or any fever over 102.2°F, chest pain, difficulty breathing, confusion, or an allergic reaction, return to your nearest emergency department as soon as possible.     CONTACT INFORMATION: For any questions or concerns, please contact our nursing staff. You may also contact your surgeon via [facility].      Jean-Paul Dc MD  Department of Orthopaedics

## 2024-08-15 NOTE — ANESTHESIA PREPROCEDURE EVALUATION
Anesthesia PreOp Note    HPI:     Charbel Olvera is a 54 year old male who presents for preoperative consultation requested by: Jean-Paul Dc MD    Date of Surgery: 8/15/2024    Procedure(s):  Left distal biceps repair  Indication: Rupture of left distal biceps tendon, initial encounter [S46.212A]    Relevant Problems   No relevant active problems       NPO:  Last Liquid Consumption Date: 08/14/24  Last Liquid Consumption Time: 2100  Last Solid Consumption Date: 08/14/24  Last Solid Consumption Time: 2100  Last Liquid Consumption Date: 08/14/24          History Review:  Patient Active Problem List    Diagnosis Date Noted    Rupture of left distal biceps tendon 07/29/2024       Past Medical History:    Visual impairment    glasses       History reviewed. No pertinent surgical history.    Medications Prior to Admission   Medication Sig Dispense Refill Last Dose    Acetaminophen 500 MG Oral Cap Take 2 capsules (1,000 mg total) by mouth every 8 (eight) hours as needed for Pain. Never exceed 3000 mg in a 24-hour period.  Many over-the-counter medications also have acetaminophen in them. 60 capsule 0 More than a month    methocarbamol 500 MG Oral Tab Take 1 tablet (500 mg total) by mouth 3 (three) times daily as needed (Muscle pain). 30 tablet 0     ibuprofen 600 MG Oral Tab Take 1 tablet (600 mg total) by mouth every 6 hours with food (Patient not taking: Reported on 7/29/2024) 20 tablet 0 8/12/2024     Current Facility-Administered Medications Ordered in Epic   Medication Dose Route Frequency Provider Last Rate Last Admin    lactated ringers infusion   Intravenous Continuous Jean-Paul Dc MD        ceFAZolin (Ancef) 2g in 10mL IV syringe premix  2 g Intravenous Once Jean-Paul Dc MD         No current [facility]-ordered outpatient medications on file.       No Known Allergies    Family History   Problem Relation Age of Onset    Cancer Father     Diabetes Mother     Diabetes Sister      Social History      Socioeconomic History    Marital status:    Tobacco Use    Smoking status: Every Day     Types: Cigarettes    Smokeless tobacco: Never   Substance and Sexual Activity    Alcohol use: Yes     Comment: daily    Drug use: Not Currently       Available pre-op labs reviewed.             Vital Signs:  Body mass index is 24.41 kg/m².   height is 1.803 m (5' 11\") and weight is 79.4 kg (175 lb). His oral temperature is 97.8 °F (36.6 °C). His blood pressure is 136/89 and his pulse is 64. His respiration is 16 and oxygen saturation is 100%.   Vitals:    08/06/24 1529 08/15/24 1148   BP:  136/89   Pulse:  64   Resp:  16   Temp:  97.8 °F (36.6 °C)   TempSrc:  Oral   SpO2:  100%   Weight: 81.6 kg (180 lb) 79.4 kg (175 lb)   Height: 1.803 m (5' 11\") 1.803 m (5' 11\")        Anesthesia Evaluation      No history of anesthetic complications   Airway   Mallampati: II  TM distance: >3 FB  Neck ROM: full  Dental      Pulmonary     breath sounds clear to auscultation  (-) COPD, asthma, sleep apnea, decreased breath sounds, wheezes  Cardiovascular   Exercise tolerance: good  (-) pacemaker, hypertension, murmur    Rhythm: regular  Rate: normal    Neuro/Psych    (-) seizures    GI/Hepatic/Renal    (-) GERD    Endo/Other    (-) diabetes mellitus  Abdominal                  Anesthesia Plan:   ASA:  2  Plan:   General  Airway:  ETT  Post-op Pain Management: Oral pain medication and IV analgesics  Informed Consent Plan and Risks Discussed With:  Patient  Use of Blood Products Discussed With:  Patient      I have informed [facility] and/or legal guardian or family member of the nature of the anesthetic plan, benefits, risks including possible dental damage if relevant, major complications, and any alternative forms of anesthetic management.   All of the patient's questions were answered to the best of my ability. The patient desires the anesthetic management as planned.  Yoav Donis MD  8/15/2024 12:07 PM  Present on  Admission:  **None**

## 2024-08-15 NOTE — OPERATIVE REPORT
PATIENT NAME: Charbel Olvera   DATE OF OPERATION: 8/15/2024      PREOPERATIVE DIAGNOSIS: Left distal biceps rupture, acute   POSTOPERATIVE DIAGNOSIS:  Left distal biceps rupture, acute      PROCEDURE PERFORMED:    1.  Left distal biceps repair/reinsertion     STAFF SURGEON:  Jean-Paul Dc MD   FIRST ASSISTANT: Robert Elliott   ANESTHESIA:  General    ANTIBIOTICS:   Ancef 2 grams.     IMPLANTS:  Implant Name Type Inv. Item Serial No.  Lot No. LRB No. Used Action   SYSTEM IMPL FIBERTAK BTTN - SN/A  SYSTEM IMPL FIBERTAK BTTN N/A [employer] WD [id number] Left 1 Implanted   ANCHOR SFT TISS BICEPS FIBERTAK - SN/A  ANCHOR SFT TISS BICEPS FIBERTAK N/A [employer] WD [id number] Left 1 Implanted   ANCHOR SFT TISS BICEPS FIBERTAK - SN/A  ANCHOR SFT TISS BICEPS FIBERTAK N/A [employer] WD [id number] Left 1 Implanted        COMPLICATIONS:  None.   CONDITION:  Stable to post anesthesia care unit.      INDICATION FOR PROCEDURE:Charbel Olvera  is a 54 year old male who presented with the above diagnosis after lifting heavy furniture at home.  We discussed the risks and benefits of operative versus nonoperative management. The risks of nonoperative management specifically the risk of deformity, weakness, loss of mobility/function, and persistent pain were discussed and the patient elected to undergo operative treatment.  We discussed benefits of the surgery including return of function and cosmesis and for pain management. We discussed alternative options including nonoperative management and alternative forms of fixation. We additionally discussed the risks of surgery, which include, but are not limited to bleeding, pain, infection, damage to nerves/vessels/tendons, incomplete relief of pain, incomplete return of function, repeat injury, need for additional surgery, blood clots, and death. The patient understands the above risks and elected to proceed.      DESCRIPTION OF THE OPERATION:  On the day of the  procedure, the patient was met in the preoperative holding area where the consent form was verified and the correct patient, laterality and procedure were identified and found to be correct.  The operative extremity was marked with indelible ink and the patient was then met by the anesthesia provider.  The patient was subsequently transferred to the operating room and placed in the supine position on the operating room table.  Once adequate anesthesia was induced and prophylactic antibiotics were infused, a tourniquet was placed high on the arm. The arm was then prepped and draped in the usual sterile fashion.  All bony prominences were well padded.     A surgical time-out was performed prior to the initiation of the surgery and the consent form was verified and the patient, laterality, and correct procedure were verified and found to be correct.  An Esmarch bandage was used to exsanguinate the Left upper extremity and the tourniquet was inflated to 250 mmHg.      A 4 cm incision was made transverse overlying the bicipital tuberosity.  This was confirmed with fluoroscopic imaging..  Sharp and dull dissection was carried down between the brachioradialis and pronator, taking care to protect the lateral antebrachial cutaneous nerve which was visualized during.  Dissection was carried down to the insertion site of the biceps on the bicipital tuberosity.  Seroma was expressed and evacuated.  A 1/4 inch osteotome was then used to create a rough, bleeding surface for healing. 2 distal biceps repair anchors were drilled and then placed on the ulnar aspect of the bicipital tuberosity  by approximately 8 mm.    Attention was then turned to identifying the biceps tendon.  This was found proximally in the upper arm retracted and scarred down to the adjacent musculature.  Blunt dissection was used to break up the scar tissue and increase excursion of the tendons.  These were then  into both the long and the  short heads of the biceps.  The remnant unhealthy appearing tissue was then debrided back to healthy bleeding tendon.  The tendon was then sequentially sewn using fiber tape sutures from the separate anchors in a [place]-style locking fashion.  With the elbow flexed, these were then pulled down into the prepared bone bed and then tied over.  No gapping was identified with elbow motion.    The wound was then copiously irrigated with normal saline. The wounds were then closed in a layered fashion with 3-0 vicryl subcutaneous sutures followed by running subcuticular 3-0 monocryl suture.  The wounds were then cleaned and dressed with dermabond, 4x4's, and webril.  The tourniquet was then let down and good perfusion was confirmed distally. A long-arm posterior slab resting splint was applied and overwrapped with ACE bandages.  Once the splint had hardened, the patient was awoken from anesthesia without complication.  The patient was subsequently transferred to the post-anesthesia care unit in good condition     POSTOPERATIVE INSTRUCTIONS:   - NWB  - Start PT/OT  - Discharge to home  - Pain protocol including elevation, icing, acetaminophen, NSAIDs, gabapentin, and oxycodone as needed  - Patient will follow up with me at two weeks where splint will be removed     First Assist Necessity and Involvement     For this procedure, a surgical assistant was present for, and assisted with, the entirety of the case. The surgical assistant was involved with patient positioning, prepping and draping, directly assisting with key portions of the case including retracting and/or managing instrumentation, and closing. The surgical assistant was necessary to ensure greater likelihood of a safe and efficient operation, and no Orthopaedic Surgery resident was available to operate as an assistant.

## 2024-08-15 NOTE — INTERVAL H&P NOTE
Pre-op Diagnosis: Rupture of left distal biceps tendon, initial encounter [S46.212A]    The above referenced H&P was reviewed by Jean-Paul Dc MD on 8/15/2024, the patient was examined and no significant changes have occurred in the patient's condition since the H&P was performed.  I discussed with the patient and/or legal representative the potential benefits, risks and side effects of this procedure; the likelihood of the patient achieving goals; and potential problems that might occur during recuperation.  I discussed reasonable alternatives to the procedure, including risks, benefits and side effects related to the alternatives and risks related to not receiving this procedure.  We will proceed with procedure as planned.

## 2024-08-15 NOTE — BRIEF OP NOTE
Pre-Operative Diagnosis: Rupture of left distal biceps tendon, initial encounter [S46.212A]     Post-Operative Diagnosis: Rupture of left distal biceps tendon, initial encounter [S46.212A]      Procedure Performed:   Left distal biceps repair    Surgeons and Role:     * Jean-Paul Dc MD - Primary    Assistant(s):  Surgical Assistant.: Robert Elliott     Surgical Findings: ruptured distal biceps     Specimen: None     Estimated Blood Loss: 2cc    Dictation Number:  N/A    Jean-Paul Dc MD  8/15/2024  3:16 PM

## 2024-08-15 NOTE — ANESTHESIA PROCEDURE NOTES
Airway  Date/Time: 8/15/2024 2:04 PM  Urgency: elective      General Information and Staff    Patient location during procedure: OR  Anesthesiologist: Yoav Donis MD  Performed: anesthesiologist   Performed by: Yoav Donis MD  Authorized by: Yoav Donis MD      Indications and Patient Condition  Indications for airway management: anesthesia  Sedation level: deep  Preoxygenated: yes  Patient position: sniffing  Mask difficulty assessment: 1 - vent by mask    Final Airway Details  Final airway type: endotracheal airway      Successful airway: ETT  Cuffed: yes   Successful intubation technique: direct laryngoscopy  Endotracheal tube insertion site: oral  Blade: Brigette  Blade size: #3  ETT size (mm): 7.5    Cormack-Lehane Classification: grade IIB - view of arytenoids or posterior of glottis only  Placement verified by: capnometry   Measured from: lips  Number of attempts at approach: 1

## 2024-08-26 ENCOUNTER — TELEPHONE (OUTPATIENT)
Dept: ORTHOPEDICS CLINIC | Facility: CLINIC | Age: 54
End: 2024-08-26

## 2024-08-26 NOTE — TELEPHONE ENCOUNTER
Pt had surgery on 8-15-24.  Discharge instruction to  a splint brace.  Where does pt .  Pt has a post op appointment scheduled 8-30-24.  Please call pt

## 2024-08-27 ENCOUNTER — OFFICE VISIT (OUTPATIENT)
Dept: PHYSICAL THERAPY | Age: 54
End: 2024-08-27
Attending: STUDENT IN AN ORGANIZED HEALTH CARE EDUCATION/TRAINING PROGRAM
Payer: MEDICAID

## 2024-08-27 DIAGNOSIS — S46.212A RUPTURE OF LEFT DISTAL BICEPS TENDON, INITIAL ENCOUNTER: Primary | ICD-10-CM

## 2024-08-27 PROCEDURE — 97110 THERAPEUTIC EXERCISES: CPT | Performed by: PHYSICAL THERAPIST

## 2024-08-27 PROCEDURE — 97161 PT EVAL LOW COMPLEX 20 MIN: CPT | Performed by: PHYSICAL THERAPIST

## 2024-08-27 NOTE — PROGRESS NOTES
P.T. EVALUATION:   Referring Physician: Dr. Dc  Diagnosis: Rupture of left distal biceps tendon, initial encounter (S46.212A)     Date of Onset: 8/15/2024 Date of Service: 8/27/2024     PATIENT SUMMARY   Patient verbalized consent for Physical Therapy evaluation and treatment.  Charbel Olvera is a 54 year old y/o male who presents to therapy today with complaints of decreased L UE mobility.  Pt describes pain level 0/10 when at rest, up to 7/10 during sudden quick motions.   History of current condition: Patient initially injured L UE while lifting heavy furniture. Patient underwent L elbow surgery 8/15. Now referred to PT for eval and tx.  Current functional limitations include decreased L UE mobility. Limited use of L UE for most activities. Patient elbow currently immobilized in soft cast. Currently, not allowed to use L UE for any lifting or carrying activities.   Charbel describes prior level of function full mobility and no limitations in activity prior to injury. Pt goals include regain full use of L IE.  Past medical history was reviewed with Charbel. Patient  has a past medical history of Visual impairment.    He has no past medical history of Anesthesia complication, Deep vein thrombosis (HCC), Diabetes (HCC), Difficult intubation, Family history of malignant hyperthermia, Family history of pseudocholinesterase deficiency, High blood pressure, History of adverse reaction to anesthesia, History of blood transfusion, motion sickness, Malignant hyperthermia, PONV (postoperative nausea and vomiting), Pseudocholinesterase deficiency, Pulmonary embolism (HCC), Sleep apnea, or Type 1 diabetes mellitus (HCC). There are no other co-morbidities at this time.        ASSESSMENT:   Referred to PT following L elbow surgery. Patient presents with the following limitations: decreased L UE mobility. Limited use of L UE for most activities. Patient elbow currently immobilized in soft cast. Currently, not allowed  to use L UE for any lifting or carrying activities.  Charbel would benefit from skilled Physical Therapy to address the above impairments to relieve pain, regain mobility, and restore use of L UE.    Precautions:   no lifting or carrying with L UE      OBJECTIVE:   Observation: Alert and oriented x 3. Patient came in with L elbow in soft cast and L IE in sling. Patient states elbow brace is on order and has not received it yet.    Palpation: (-) tenderness along L hand    ROM: L elbow currently immobilized at 90. L wrist and hand ROM is WFL into all planes. L forearm pronation 0-80, supination 0-40.L shoulder ROM is WFL into all planes.    Strength/MMT: L shoulder strength is grossly 4/5, L wrist and hand grossly 3/5.    Sensation: altered sensation on dorsum of L hand. WNL throughout rest of L UE.    Posture: WNL    Balance: WNL    Gait: WNL without device.    Fall History for the past 2 years: NA    Functional Outcome Measure: QuickDASH Outcome Score  Score: 59.09 % (8/26/2024  2:01 PM)      Today’s Treatment and Response:  Patient education provided on PT eval findings, treatment plan, goals, HEP.  Patient received today:  PT Eval Low Complexity,   Therapeutic Exercises x 10min: L forearm PROM. Reviewed surgical and post-op precautions.    Charges: PT Eval Low Complexity, Thera Ex1      Total Timed Treatment: 10 min     Total Treatment Time: 35 min         PLAN OF CARE:    Goals: to be met in 8 weeks  Patient will be independent with HEP, its progression.  Patient will report no more than 1/10 during activity.  Increase L UE ROM to WFL into all planes to improve mobility.  Increase L UE strength to at least 4/5 throughout to increase use of L UE.    Frequency / Duration: Patient will be seen for 1-2 x/week or a total of 12 visits over a 90 day period. Treatment will include: Modalities prn, manual therapy, therapeutic exercises, therapeutic activity, and instructions on a home program.     Education or treatment  limitation: None    Rehab Potential:excellent    Patient/Family/Caregiver was advised of these findings, precautions, and treatment options and has agreed to actively participate in planning and for this course of care.    Thank you for your referral. Please co-sign or sign and return this letter via fax as soon as possible to [phone]. If you have any questions, please contact me at Dept: [phone]    Sincerely,  Electronically signed by therapist: Tl Suarez, PT    Certification From: 8/27/2024  To:11/25/2024

## 2024-08-28 ENCOUNTER — TELEPHONE (OUTPATIENT)
Dept: ORTHOPEDICS CLINIC | Facility: CLINIC | Age: 54
End: 2024-08-28

## 2024-08-28 NOTE — TELEPHONE ENCOUNTER
Spoke with Yasemin Powell. She did not remove the sling, and basically gave patient same instructions I would have. Called patient and confirmed that he bring the brace to the post-op on Friday. Verbalized understanding. No further questions or concerns at this time.

## 2024-08-28 NOTE — TELEPHONE ENCOUNTER
Yasemin [facility] calling regarding cast on arm. Patient would like to take off now to place brace on. Please call to advise if this is ok, at [phone], select 7, thanks.

## 2024-08-29 ENCOUNTER — APPOINTMENT (OUTPATIENT)
Dept: PHYSICAL THERAPY | Age: 54
End: 2024-08-29
Attending: STUDENT IN AN ORGANIZED HEALTH CARE EDUCATION/TRAINING PROGRAM
Payer: MEDICAID

## 2024-08-30 ENCOUNTER — OFFICE VISIT (OUTPATIENT)
Dept: ORTHOPEDICS CLINIC | Facility: CLINIC | Age: 54
End: 2024-08-30

## 2024-08-30 DIAGNOSIS — Z47.89 ORTHOPEDIC AFTERCARE: Primary | ICD-10-CM

## 2024-08-30 PROCEDURE — 99024 POSTOP FOLLOW-UP VISIT: CPT | Performed by: STUDENT IN AN ORGANIZED HEALTH CARE EDUCATION/TRAINING PROGRAM

## 2024-08-31 NOTE — PROGRESS NOTES
Orthopaedic Surgery Postop Patient Visit  _______________________________________________________________________________________________________________  _______________________________________________________________________________________________________________    DATE OF VISIT: 08/30/24      DATE OF SURGERY: 8/15/24     CHIEF COMPLAINT: Follow-up Left  distal biceps repair surgery        Chief Complaint   Patient presents with    Post-Op     1st POV Left distal biceps repair, sx on 8/15/2024. Has intermittent sharp dull pain 4/10 on forearm with tingling sensation on dorsal aspect of hand.        HISTORY OF PRESENT ILLNESS: Charbel Olvera is a 54 year old male who presents to the clinic for follow-up after Left  distal biceps repair surgery. The patient has not started working with therapy. Pain is well controlled on current regimen.  He does appreciate a little bit of numbness about the posterior aspect of his hand but otherwise denies any specific issues.    The patient denies fevers, chills, and wound issues.     MEDICATIONS  No outpatient medications have been marked as taking for the 8/30/24 encounter (Office Visit) with Jean-Paul Dc MD.        ALLERGIES  No Known Allergies     REVIEW OF SYSTEMS  A 14 point review of systems was performed. Pertinent positives and negatives noted in the HPI.    PHYSICAL EXAM  There were no vitals taken for this visit.     Constitutional: The patient is well-developed, well-nourished, in no acute distress.  Neurological: Alert and oriented to person, place, and time.  Psychiatric: Mood and affect normal.  Head: Normocephalic and atraumatic.  Cardiovascular: regular rate by palpation  Pulmonary/Chest: Effort normal. No respiratory distress. Breathing non-labored  Abdominal: Abdomen exhibits no distension.   Left elbow  Wound well appearing.  Swelling resolving  ROM: Flexion: 120, Extension: 90, Pronation: 80, Supination: 30  Motor/Strength:  Motor intact  Ax/Msc/M/U/R  Flexion: Deferred  Extension: Deferred  Pronation: Deferred  Supination: Deferred  SILT U/M/MSC/axillary.  Slightly altered in radial nerve distribution  Radial pulse 2+, distally warm and well perfused, brisk capillary refill      ASSESSMENT/PLAN: Charbel Olvera is a 54 year old male who presents to the Orthopaedic surgery clinic today for follow-up 2 weeks after distal biceps repair surgery.  He is doing well but does have some sensory neurologic alterations which we will monitor    We reviewed the PT protocol and adjustments were made as needed (Typical below):  Protocol (2-6 weeks):  Transition out of splint/into hinged elbow brace  Motion set 120 (60 from full) degrees extension to full flexion at 2 weeks from surgery  Motion set 150 (30 from full) degrees extension to full flexion at 4 weeks from surgery  Motion set full extension to full flexion at 6 weeks from surgery/okay to discontinue brace  Normalize shoulder and wrist motion  Avoid ACTIVE use of biceps muscle for elbow flexion and forearm supination (turning palm upwards)  Weightbearing: <5lbs  Protocol (7-12 weeks):  Progress all motion to normal  Begin active flexion of elbow with resistance  Weightbearing: <10 pounds  Protocol (12 weeks-6 months):  Return to sport/occupation-specific activity  Begin all strengthening including push-ups, pull-ups, and overhead lifting as tolerated  Weightbearing: As tolerated    NEW PRESCRIPTIONS: none  IMAGING ORDERED: none  CONSULTS PLACED: none    FOLLOW-UP: 4 weeks    RADIOGRAPHS AT NEXT VISIT: none    I have personally seen Charbel Olvera and discussed in detail their plan of care. Prior to departure, they indicated agreement with and understanding of their plan of care and their follow-up as documented herein this note. Please note that this note was written in combination with voice recognition/dictation software and there is a possibility of transcription errors which were not identified at  the time of note submission. If clarification is necessary, please contact the author or clinic staff.    Jean-Paul Dc MD  Orthopaedic Surgery  8/30/2024

## 2024-09-03 ENCOUNTER — OFFICE VISIT (OUTPATIENT)
Dept: PHYSICAL THERAPY | Age: 54
End: 2024-09-03
Attending: PHYSICAL THERAPIST
Payer: MEDICAID

## 2024-09-03 PROCEDURE — 97110 THERAPEUTIC EXERCISES: CPT | Performed by: PHYSICAL THERAPIST

## 2024-09-03 PROCEDURE — 97530 THERAPEUTIC ACTIVITIES: CPT | Performed by: PHYSICAL THERAPIST

## 2024-09-03 NOTE — PROGRESS NOTES
Diagnosis: Rupture of left distal biceps tendon, initial encounter (S46.212A)         Next MD visit: none scheduled  Fall Risk: standard         Precautions: n/a          Medication Changes since last visit?: No  Date of surgery: 8/15/2024      Subjective: Patient complains of R arm pain and soreness. States he has been wearing the brace.   Pt describes pain level 6/10.     Objective:  3rd week post-op   L elbow brace with extension stop at 45. L wrist and hand ROM is WFL into all planes. L forearm pronation 0-80, supination 0-40.L shoulder ROM is WFL into all planes     Assessment: Demonstrates good brace alignment. Tolerated ROM exercises well. No change in pain with repeated PROM. Patient and PT goals are in progress.      Plan: Continue progression of PT to improve mobility. Adjust extension stop to 30 deg next visit.     9/3/2024  Visit#: 2/12 until 10/26 [facility]   Thera Ex   X10min  - L elbow PROM into flexion / extension in brace  - L forearm pronation / supination in brace at 90 deg elbow flexion  - L shoulder AROM into flexion and abduction     Therapeutic activty   X10min  - reviewed post-op precautions and limitations in L UE activity.  - adjusted brace into alignment  - reviewed treatment plan     Neuromuscular Re-education      Modalities                      Charges: EX1, Thera Act1       Total Timed Treatment: 20 min  Total Treatment Time: 20 min

## 2024-09-04 ENCOUNTER — APPOINTMENT (OUTPATIENT)
Dept: PHYSICAL THERAPY | Age: 54
End: 2024-09-04
Attending: PHYSICAL THERAPIST
Payer: MEDICAID

## 2024-09-05 ENCOUNTER — APPOINTMENT (OUTPATIENT)
Dept: PHYSICAL THERAPY | Age: 54
End: 2024-09-05
Attending: PHYSICAL THERAPIST
Payer: MEDICAID

## 2024-09-09 ENCOUNTER — OFFICE VISIT (OUTPATIENT)
Dept: PHYSICAL THERAPY | Age: 54
End: 2024-09-09
Attending: STUDENT IN AN ORGANIZED HEALTH CARE EDUCATION/TRAINING PROGRAM
Payer: MEDICAID

## 2024-09-09 PROCEDURE — 97530 THERAPEUTIC ACTIVITIES: CPT | Performed by: PHYSICAL THERAPIST

## 2024-09-09 PROCEDURE — 97110 THERAPEUTIC EXERCISES: CPT | Performed by: PHYSICAL THERAPIST

## 2024-09-09 NOTE — PROGRESS NOTES
Diagnosis: Rupture of left distal biceps tendon, initial encounter (S46.212A)         Next MD visit: none scheduled  Fall Risk: standard         Precautions: n/a          Medication Changes since last visit?: No  Date of surgery: 8/15/2024      Subjective: Patient states his arm is feeling good.   Pt describes pain level 0/10 when at rest.    Objective:  4th week post-op   L elbow brace with extension stop at 30 deg. L wrist and hand ROM is WFL into all planes. L forearm pronation 0-80, supination 0-70.L shoulder ROM is WFL into all planes     Assessment: Demonstrates good brace alignment. Tolerated progression of ROM exercises well.  Patient and PT goals are in progress.      Plan: Continue progression of PT to improve mobility. Progress to gravity eliminated elbow flexion AROM progressing to against gravityAdjust extension stop to 20 deg next visit.     9/9/2024  Visit#: 3/12 until 10/26 9/3/2024  Visit#: 2/12 until 10/26 [facility]   Thera Ex   X10min  - L elbow PROM into flexion / extension in brace  - L elbow AROM in gravity eliminated position 10 x 2  - L forearm pronation / supination PROM in 90 elbow flexion   X10min  - L elbow PROM into flexion / extension in brace  - L forearm pronation / supination in brace at 90 deg elbow flexion  - L shoulder AROM into flexion and abduction     Therapeutic activty   X10min  - reviewed post-op precautions and limitations in L UE activity.  - adjusted brace into alignment  - reviewed progression of treatment plan X10min  - reviewed post-op precautions and limitations in L UE activity.  - adjusted brace into alignment  - reviewed treatment plan     Neuromuscular Re-education       Modalities                         Charges: EX1, Thera Act1       Total Timed Treatment: 20 min  Total Treatment Time: 20 min

## 2024-09-13 ENCOUNTER — APPOINTMENT (OUTPATIENT)
Dept: PHYSICAL THERAPY | Age: 54
End: 2024-09-13
Attending: PHYSICAL THERAPIST
Payer: MEDICAID

## 2024-09-16 ENCOUNTER — OFFICE VISIT (OUTPATIENT)
Dept: PHYSICAL THERAPY | Age: 54
End: 2024-09-16
Attending: STUDENT IN AN ORGANIZED HEALTH CARE EDUCATION/TRAINING PROGRAM
Payer: MEDICAID

## 2024-09-16 PROCEDURE — 97110 THERAPEUTIC EXERCISES: CPT

## 2024-09-16 PROCEDURE — 97140 MANUAL THERAPY 1/> REGIONS: CPT

## 2024-09-16 NOTE — PROGRESS NOTES
Diagnosis: Rupture of left distal biceps tendon, initial encounter (S46.212A)         Next MD visit: none scheduled  Fall Risk: standard         Precautions: n/a          Medication Changes since last visit?: No  Date of surgery: 8/15/2024    Subjective: Patient states his arm is feeling good  He reports no pain.      Objective:  4th week post-op   L elbow brace with extension stop at 30 deg. L wrist and hand ROM is WFL into all planes. L forearm pronation 0-80, supination 0-70.L shoulder ROM is WFL into all planes     Assessment:  Initiated gravity eliminated exercises without brace and against gravity exercises without brace as pt has no pain.  Trial of STM to biceps/wrist musculature.      Plan: Continue progression of PT to improve mobility.      9/16/2024  Visit#: 4/12 until 10/26 9/9/2024  Visit#: 3/12 until 10/26 9/3/2024  Visit#: 2/12 until 10/26 [facility]   Thera Ex   X 12 minutes  - seated L elbow AROM in gravity eliminated position 10 x 2  - seated L wrist AROM pronation/supination with brace off elbow 90 deg 2 x 10  - seated L wrist circles CW/CCW 10x ea  - seated L wrist ext/flx 10x ea  - seated L elbow flexion/extension (brace on) 2 x 10   - seated L elbow flexion/extension (brace off) 2 x 10        X10 min  - L elbow PROM into flexion / extension in brace  - L elbow AROM in gravity eliminated position 10 x 2  - L forearm pronation / supination PROM in 90 elbow flexion   X10min  - L elbow PROM into flexion / extension in brace  - L forearm pronation / supination in brace at 90 deg elbow flexion  - L shoulder AROM into flexion and abduction     Therapeutic activty    X10min  - reviewed post-op precautions and limitations in L UE activity.  - adjusted brace into alignment  - reviewed progression of treatment plan X10min  - reviewed post-op precautions and limitations in L UE activity.  - adjusted brace into alignment  - reviewed treatment plan     Neuromuscular Re-education          Modalities         Manual Tx X 10 minutes  - STM L distal biceps, wrist flexors and extensors                 Charges: EX1, Man 1      Total Timed Treatment: 22 min  Total Treatment Time: 22 min

## 2024-09-18 ENCOUNTER — APPOINTMENT (OUTPATIENT)
Dept: PHYSICAL THERAPY | Age: 54
End: 2024-09-18
Attending: STUDENT IN AN ORGANIZED HEALTH CARE EDUCATION/TRAINING PROGRAM
Payer: MEDICAID

## 2024-09-23 ENCOUNTER — OFFICE VISIT (OUTPATIENT)
Dept: PHYSICAL THERAPY | Age: 54
End: 2024-09-23
Attending: STUDENT IN AN ORGANIZED HEALTH CARE EDUCATION/TRAINING PROGRAM
Payer: MEDICAID

## 2024-09-23 PROCEDURE — 97110 THERAPEUTIC EXERCISES: CPT | Performed by: PHYSICAL THERAPIST

## 2024-09-23 NOTE — PROGRESS NOTES
Progress Summary  Pt has attended 5 visits in Physical Therapy.     Diagnosis: Rupture of left distal biceps tendon, initial encounter (S46.212A)         Next MD visit: none scheduled  Fall Risk: standard         Precautions: n/a          Medication Changes since last visit?: No  Date of surgery: 8/15/2024    Subjective: Patient states his arm is feeling good  Still reports no pain.    Objective:  6th week post-op   L elbow ROM is WFL at 0 to 138. Reports no pain when at rest and during active motion.  L elbow brace with extension stop at 20 deg. L wrist and hand ROM is WFL into all planes. L forearm pronation 0-90, supination 0-80.L shoulder ROM is WFL into all planes     Assessment:  Referred to PT following R UE surgery. Patient is currently on his 6th week post-op and has received 5 PT visits. ROM is now WFL. Presents with painfree elbow full flexion and extension. Reports some numbness on dorsum of L hand but is getting better. May benefit from continued PT to progress rehab and regain full L UE mobility.     9/23/2024  Visit#: 5/12 until 10/26 9/16/2024  Visit#: 4/12 until 10/26 9/9/2024  Visit#: 3/12 until 10/26 9/3/2024  Visit#: 2/12 until 10/26 [facility]   Thera Ex   X25min  - seated L elbow AROM in brace against gravity 20 x 2  - seated L forearm pronation / supination at 90  - red therabar wrist flexion / extension  - reviewed HEP  - seated L wrist ext/flx 10x ea  - seated L elbow flexion/extension (brace on) 2 x 10   - seated L elbow flexion/extension (brace off) 2 x 10  X 12 minutes  - seated L elbow AROM in gravity eliminated position 10 x 2  - seated L wrist AROM pronation/supination with brace off elbow 90 deg 2 x 10  - seated L wrist circles CW/CCW 10x ea  - seated L wrist ext/flx 10x ea  - seated L elbow flexion/extension (brace on) 2 x 10   - seated L elbow flexion/extension (brace off) 2 x 10        X10 min  - L elbow PROM into flexion / extension in brace  - L elbow AROM in gravity  eliminated position 10 x 2  - L forearm pronation / supination PROM in 90 elbow flexion   X10min  - L elbow PROM into flexion / extension in brace  - L forearm pronation / supination in brace at 90 deg elbow flexion  - L shoulder AROM into flexion and abduction     Therapeutic activty     X10min  - reviewed post-op precautions and limitations in L UE activity.  - adjusted brace into alignment  - reviewed progression of treatment plan X10min  - reviewed post-op precautions and limitations in L UE activity.  - adjusted brace into alignment  - reviewed treatment plan     Neuromuscular Re-education           Modalities         Manual Tx  X 10 minutes  - STM L distal biceps, wrist flexors and extensors                  Charges: EX2   Total Timed Treatment: 25 min  Total Treatment Time: 25 min    Plan: Continue skilled Physical Therapy 1 - 2 x/week or a total of 10 visits over a 90 day period. Treatment will include: progression of therapeutic exercises to improve mobility.       Patient/Family/Caregiver was advised of these findings, precautions, and treatment options and has agreed to actively participate in planning and for this course of care.    Thank you for your referral. If you have any questions, please contact me at Dept: [phone].    Sincerely,  Electronically signed by therapist: Tl Suarez Jr., PT     Certification From: 9/23/2024  To:12/22/2024

## 2024-09-25 ENCOUNTER — APPOINTMENT (OUTPATIENT)
Dept: PHYSICAL THERAPY | Age: 54
End: 2024-09-25
Attending: STUDENT IN AN ORGANIZED HEALTH CARE EDUCATION/TRAINING PROGRAM
Payer: MEDICAID

## 2024-09-27 ENCOUNTER — OFFICE VISIT (OUTPATIENT)
Dept: ORTHOPEDICS CLINIC | Facility: CLINIC | Age: 54
End: 2024-09-27
Payer: MEDICAID

## 2024-09-27 DIAGNOSIS — Z47.89 ORTHOPEDIC AFTERCARE: Primary | ICD-10-CM

## 2024-09-27 PROCEDURE — 99024 POSTOP FOLLOW-UP VISIT: CPT | Performed by: STUDENT IN AN ORGANIZED HEALTH CARE EDUCATION/TRAINING PROGRAM

## 2024-09-27 NOTE — PROGRESS NOTES
Orthopaedic Surgery Postop Patient Visit  _______________________________________________________________________________________________________________  _______________________________________________________________________________________________________________    DATE OF VISIT: 09/27/24      DATE OF SURGERY: 8/15/2024     CHIEF COMPLAINT: Follow-up Left  distal biceps repair surgery        Chief Complaint   Patient presents with    Post-Op     2nd POV Left distal biceps repair, sx on 8/15/2024. No pain         HISTORY OF PRESENT ILLNESS: Charbel Olvera is a 54 year old male who presents to the clinic for follow-up after Left  distal biceps repair surgery. The patient has progressed with therapy. Pain is  absent  on current regimen.  He feels he is progressing very well    The patient denies fevers, chills, and wound issues.     MEDICATIONS  No outpatient medications have been marked as taking for the 9/27/24 encounter (Office Visit) with Jean-Paul Dc MD.        ALLERGIES  No Known Allergies     REVIEW OF SYSTEMS  A 14 point review of systems was performed. Pertinent positives and negatives noted in the HPI.    PHYSICAL EXAM  There were no vitals taken for this visit.     Constitutional: The patient is well-developed, well-nourished, in no acute distress.  Neurological: Alert and oriented to person, place, and time.  Psychiatric: Mood and affect normal.  Head: Normocephalic and atraumatic.  Cardiovascular: regular rate by palpation  Pulmonary/Chest: Effort normal. No respiratory distress. Breathing non-labored  Abdominal: Abdomen exhibits no distension.   Left elbow  Wound well healed  Swelling resolved  ROM: Flexion: 140, Extension: 0, Pronation: 80, Supination: 80  Motor/Strength:  Motor intact Ax/Msc/M/U/R  Flexion: 4+/5  Extension: 5/5  Pronation: 5/5  Supination: 4+/5  SILT R/U/M/MSC/axillary  Radial pulse 2+, distally warm and well perfused, brisk capillary refill      ASSESSMENT/PLAN: Charbel  May is a 54 year old male who presents to the Orthopaedic surgery clinic today for follow-up 6 weeks after distal biceps repair surgery.  He is doing very well    We reviewed the PT protocol and adjustments were made as needed (Typical below):  Protocol (7-12 weeks):  Okay to discontinue brace  Progress all motion to normal  Begin active flexion of elbow with resistance  Weightbearing: <10 pounds  Protocol (12 weeks-6 months):  Return to sport/occupation-specific activity  Begin all strengthening including push-ups, pull-ups, and overhead lifting as tolerated  Weightbearing: As tolerated    NEW PRESCRIPTIONS: none  IMAGING ORDERED: none  CONSULTS PLACED: none    FOLLOW-UP: 6 weeks as needed    RADIOGRAPHS AT NEXT VISIT: none    I have personally seen Charbel Olvera and discussed in detail their plan of care. Prior to departure, they indicated agreement with and understanding of their plan of care and their follow-up as documented herein this note. Please note that this note was written in combination with voice recognition/dictation software and there is a possibility of transcription errors which were not identified at the time of note submission. If clarification is necessary, please contact the author or clinic staff.    Jean-Paul Dc MD  Orthopaedic Surgery  9/27/2024

## 2024-10-01 ENCOUNTER — APPOINTMENT (OUTPATIENT)
Dept: PHYSICAL THERAPY | Age: 54
End: 2024-10-01
Attending: STUDENT IN AN ORGANIZED HEALTH CARE EDUCATION/TRAINING PROGRAM
Payer: MEDICAID

## 2024-10-03 ENCOUNTER — OFFICE VISIT (OUTPATIENT)
Dept: PHYSICAL THERAPY | Age: 54
End: 2024-10-03
Attending: STUDENT IN AN ORGANIZED HEALTH CARE EDUCATION/TRAINING PROGRAM
Payer: MEDICAID

## 2024-10-03 PROCEDURE — 97110 THERAPEUTIC EXERCISES: CPT

## 2024-10-03 NOTE — PROGRESS NOTES
Diagnosis: Rupture of left distal biceps tendon, initial encounter (S46.212A)         Next MD visit: none scheduled  Fall Risk: standard         Precautions: n/a          Medication Changes since last visit?: No  Date of surgery: 8/15/2024    Subjective: Patient reports he saw his surgeon who stated he can discontinue the brace and begin more activity, but with restrictions of no lifting/weightbearing >10 pounds.      Objective:    10/3/2024:  7th week post-op     9/23/2024:  L elbow ROM is WFL at 0 to 138. Reports no pain when at rest and during active motion.  L elbow brace with extension stop at 20 deg. L wrist and hand ROM is WFL into all planes. L forearm pronation 0-90, supination 0-80.L shoulder ROM is WFL into all planes     Assessment:  Initiated upper extremity resistance exercises per protocol.      10/3/2024  Visit#: 6/12 until 10/26 9/23/2024  Visit#: 5/12 until 10/26 9/16/2024  Visit#: 4/12 until 10/26   Thera Ex   X 28 min  - SciFit UEs only level 1 (x 5 minutes)   - serratus wall slides 2 x 10  - standing L bicep curls 2# 3 x 10  - standing L tricep pull downs with GTB 3 x 10  - standing B shoulder extensions with GSC 3 x 10  - standing B scap rows with Blue sport cord 3 x 10  - standing/bent over L tricep extension with 5# DB 2 x 10  - seated L wrist pronation/supination with 3# DB 2 x 10  - seated L hammer curl 2# 3 x 10  - supine L chest press 3# 2 x 10  - supine L shoulder flexion 2# 2 x 10  - supine B shoulder horizontal abd/add with 2# 2 x 10   X25min  - seated L elbow AROM in brace against gravity 20 x 2  - seated L forearm pronation / supination at 90  - red therabar wrist flexion / extension  - reviewed HEP  - seated L wrist ext/flx 10x ea  - seated L elbow flexion/extension (brace on) 2 x 10   - seated L elbow flexion/extension (brace off) 2 x 10  X 12 minutes  - seated L elbow AROM in gravity eliminated position 10 x 2  - seated L wrist AROM pronation/supination with brace off elbow 90 deg  2 x 10  - seated L wrist circles CW/CCW 10x ea  - seated L wrist ext/flx 10x ea  - seated L elbow flexion/extension (brace on) 2 x 10   - seated L elbow flexion/extension (brace off) 2 x 10          Therapeutic activty        Neuromuscular Re-education          Modalities   - ice pack to left biceps in supine   X 10 minutes     Manual Tx   X 10 minutes  - STM L distal biceps, wrist flexors and extensors             Charges: EX 2   Total Timed Treatment: 28 min  Total Treatment Time: 38 min    Plan: continue PT

## 2024-10-07 ENCOUNTER — OFFICE VISIT (OUTPATIENT)
Dept: PHYSICAL THERAPY | Age: 54
End: 2024-10-07
Attending: STUDENT IN AN ORGANIZED HEALTH CARE EDUCATION/TRAINING PROGRAM
Payer: MEDICAID

## 2024-10-07 PROCEDURE — 97110 THERAPEUTIC EXERCISES: CPT | Performed by: PHYSICAL THERAPIST

## 2024-10-07 NOTE — PROGRESS NOTES
Diagnosis: Rupture of left distal biceps tendon, initial encounter (S46.212A)         Next MD visit: none scheduled  Fall Risk: standard         Precautions: n/a          Medication Changes since last visit?: No  Date of surgery: 8/15/2024    Subjective: Patient reports he saw his surgeon who stated he can discontinue the brace and begin more activity, but with restrictions of no lifting/weightbearing >10 pounds.      Objective:  L elbow ROM is WFL at 0 to 140. Reports no pain when at rest and during active motion.  L UE strength grossly 4/5 into all planes.    Assessment:  Initiated upper extremity resistance exercises per protocol.      10/7/2024  Visit#: 7/12 until 10/26 10/3/2024  Visit#: 6/12 until 10/26 9/23/2024  Visit#: 5/12 until 10/26 9/16/2024  Visit#: 4/12 until 10/26   Thera Ex   X40min  - Sci-fit UE L2 3min fwd/3min bwd  - supine serratus press with 5lb db 10 x 3  - supine overhead shoulder flexion, horizontal abduction with 3lb db 10 x 2  - sidelying L shoulder external rotation 3lb, 5lb 10 x 2  - sidelying L shoulder scaption 3lb db 10 x 2  - bicep curls with 5lb wt  - red therabar wrist flexion / extension, forearm pronatiom and supination  - tricep pull down 5# 10 x 2  - shoulder extension 5# 10 x 2  - single arm row 5# 10 x 2  - shoulder external and internal rotation 0-5# 10 x 2  - shoulder flexion, abduction 3# 10 x 2   X 28 min  - SciFit UEs only level 1 (x 5 minutes)   - serratus wall slides 2 x 10  - standing L bicep curls 2# 3 x 10  - standing L tricep pull downs with GTB 3 x 10  - standing B shoulder extensions with GSC 3 x 10  - standing B scap rows with Blue sport cord 3 x 10  - standing/bent over L tricep extension with 5# DB 2 x 10  - seated L wrist pronation/supination with 3# DB 2 x 10  - seated L hammer curl 2# 3 x 10  - supine L chest press 3# 2 x 10  - supine L shoulder flexion 2# 2 x 10  - supine B shoulder horizontal abd/add with 2# 2 x 10   X25min  - seated L elbow AROM in brace  against gravity 20 x 2  - seated L forearm pronation / supination at 90  - red therabar wrist flexion / extension  - reviewed HEP  - seated L wrist ext/flx 10x ea  - seated L elbow flexion/extension (brace on) 2 x 10   - seated L elbow flexion/extension (brace off) 2 x 10  X 12 minutes  - seated L elbow AROM in gravity eliminated position 10 x 2  - seated L wrist AROM pronation/supination with brace off elbow 90 deg 2 x 10  - seated L wrist circles CW/CCW 10x ea  - seated L wrist ext/flx 10x ea  - seated L elbow flexion/extension (brace on) 2 x 10   - seated L elbow flexion/extension (brace off) 2 x 10          Therapeutic activty         Neuromuscular Re-education           Modalities    - ice pack to left biceps in supine   X 10 minutes       Manual Tx    X 10 minutes  - STM L distal biceps, wrist flexors and extensors              Charges: EX3   Total Timed Treatment: 40 min  Total Treatment Time: 40 min    Plan: continue PT

## 2024-10-09 ENCOUNTER — APPOINTMENT (OUTPATIENT)
Dept: PHYSICAL THERAPY | Age: 54
End: 2024-10-09
Attending: STUDENT IN AN ORGANIZED HEALTH CARE EDUCATION/TRAINING PROGRAM
Payer: MEDICAID

## 2024-10-14 ENCOUNTER — OFFICE VISIT (OUTPATIENT)
Dept: PHYSICAL THERAPY | Age: 54
End: 2024-10-14
Attending: STUDENT IN AN ORGANIZED HEALTH CARE EDUCATION/TRAINING PROGRAM
Payer: MEDICAID

## 2024-10-14 PROCEDURE — 97112 NEUROMUSCULAR REEDUCATION: CPT | Performed by: PHYSICAL THERAPIST

## 2024-10-14 PROCEDURE — 97110 THERAPEUTIC EXERCISES: CPT | Performed by: PHYSICAL THERAPIST

## 2024-10-14 NOTE — PROGRESS NOTES
Diagnosis: Rupture of left distal biceps tendon, initial encounter (S46.212A)         Next MD visit: none scheduled  Fall Risk: standard         Precautions: n/a          Medication Changes since last visit?: No  Date of surgery: 8/15/2024    Subjective: Patient reports his arm is feeling good. States 0/10 pain at this time.    Objective:  L elbow ROM is WFL at 0 to 140. Reports no pain when at rest and during active motion.  L UE strength grossly 4/5 into all planes.    Assessment:  Patient is progressing well. Demonstrates improved UE strength and no pain. Patient and PT goals are in progress.     10/14/2024  Visit#: 8/12 until 10/26 10/7/2024  Visit#: 7/12 until 10/26 10/3/2024  Visit#: 6/12 until 10/26   Thera Ex   X25min  - supine serratus press with 5- 8lb db's 10 x 3  - supine overhead shoulder flexion, horizontal abduction with 5lb db 10 x 2  - sidelying L shoulder external rotation 5lb db 10 x 3  - sidelying L shoulder scaption 5lb db 10 x 3  - prone L shoulder horizontal abduction, flexion with 5lb db 10 x 2  - green therabar wrist flexion / extension, forearm pronation, and supination  - shoulder rows 20#, extension 15# 10 x 3  - B UE tricep pull down 20# 10 x 2  - shoulder external and internal rotation 0-5# 10 x 2       X40min  - Sci-fit UE L2 3min fwd/3min bwd  - supine serratus press with 5lb db 10 x 3  - supine overhead shoulder flexion, horizontal abduction with 3lb db 10 x 2  - sidelying L shoulder external rotation 3lb, 5lb 10 x 2  - sidelying L shoulder scaption 3lb db 10 x 2  - bicep curls with 5lb wt  - red therabar wrist flexion / extension, forearm pronatiom and supination  - tricep pull down 5# 10 x 2  - shoulder extension 5# 10 x 2  - single arm row 5# 10 x 2  - shoulder external and internal rotation 0-5# 10 x 2  - shoulder flexion, abduction 3# 10 x 2   X 28 min  - SciFit UEs only level 1 (x 5 minutes)   - serratus wall slides 2 x 10  - standing L bicep curls 2# 3 x 10  - standing L  tricep pull downs with GTB 3 x 10  - standing B shoulder extensions with GSC 3 x 10  - standing B scap rows with Blue sport cord 3 x 10  - standing/bent over L tricep extension with 5# DB 2 x 10  - seated L wrist pronation/supination with 3# DB 2 x 10  - seated L hammer curl 2# 3 x 10  - supine L chest press 3# 2 x 10  - supine L shoulder flexion 2# 2 x 10  - supine B shoulder horizontal abd/add with 2# 2 x 10     Therapeutic activty        Neuromuscular Re-education   X8 min to improve UE control and stability  - swiss ball and wall push ups  - swiss ball CKC circles 20x cw/ccw       Modalities     - ice pack to left biceps in supine   X 10 minutes     Manual Tx                  Charges: EX2, neuro re-ed   Total Timed Treatment: 35 min  Total Treatment Time: 35 min    Plan: continue PT

## 2024-10-31 ENCOUNTER — APPOINTMENT (OUTPATIENT)
Dept: PHYSICAL THERAPY | Age: 54
End: 2024-10-31
Attending: PHYSICAL THERAPIST
Payer: MEDICAID

## 2024-11-05 ENCOUNTER — APPOINTMENT (OUTPATIENT)
Dept: PHYSICAL THERAPY | Age: 54
End: 2024-11-05
Attending: PHYSICAL THERAPIST
Payer: MEDICAID

## 2024-11-05 ENCOUNTER — TELEPHONE (OUTPATIENT)
Dept: PHYSICAL THERAPY | Age: 54
End: 2024-11-05

## 2024-11-08 ENCOUNTER — APPOINTMENT (OUTPATIENT)
Dept: PHYSICAL THERAPY | Age: 54
End: 2024-11-08
Attending: PHYSICAL THERAPIST
Payer: MEDICAID

## 2024-11-12 ENCOUNTER — APPOINTMENT (OUTPATIENT)
Dept: PHYSICAL THERAPY | Age: 54
End: 2024-11-12
Attending: PHYSICAL THERAPIST
Payer: MEDICAID

## 2024-11-14 ENCOUNTER — APPOINTMENT (OUTPATIENT)
Dept: PHYSICAL THERAPY | Age: 54
End: 2024-11-14
Attending: PHYSICAL THERAPIST
Payer: MEDICAID

## 2024-11-19 ENCOUNTER — APPOINTMENT (OUTPATIENT)
Dept: PHYSICAL THERAPY | Age: 54
End: 2024-11-19
Attending: PHYSICAL THERAPIST
Payer: MEDICAID

## 2024-11-21 ENCOUNTER — APPOINTMENT (OUTPATIENT)
Dept: PHYSICAL THERAPY | Age: 54
End: 2024-11-21
Attending: PHYSICAL THERAPIST
Payer: MEDICAID

## (undated) DEVICE — ANTIBACTERIAL UNDYED BRAIDED (POLYGLACTIN 910), SYNTHETIC ABSORBABLE SUTURE: Brand: COATED VICRYL

## (undated) DEVICE — COTTON UNDERCAST PADDING,REGULAR FINISH: Brand: WEBRIL

## (undated) DEVICE — C-ARM: Brand: UNBRANDED

## (undated) DEVICE — SUT MCRYL 3-0 27IN ABSRB UD L24MM PS-1

## (undated) DEVICE — ADHESIVE SKIN TOP FOR WND CLSR DERMBND ADV

## (undated) DEVICE — CABLE BPLR L12FT FLYING LD DISP

## (undated) DEVICE — SLING ORTH L16.5IN D7IN M DK BLU POLY COT ARM

## (undated) DEVICE — SOLUTION IRRIG 1000ML 0.9% NACL USP BTL

## (undated) DEVICE — PACK CDS UPPER EXTREMITY

## (undated) DEVICE — BNDG,ELSTC,MATRIX,STRL,4"X5YD,LF,HOOK&LP: Brand: MEDLINE

## (undated) DEVICE — APPLICATOR PREP 26ML W/ ST SOL CHLORAPREP

## (undated) DEVICE — SPLINT ONESTEP 5X30IN FBRGLS MOLD

## (undated) DEVICE — BANDAGE CAST 5X30IN HT PLSTR X FAST SET

## (undated) DEVICE — GAMMEX® PI HYBRID SIZE 8, STERILE POWDER-FREE SURGICAL GLOVE, POLYISOPRENE AND NEOPRENE BLEND: Brand: GAMMEX

## (undated) DEVICE — Device

## (undated) DEVICE — SUT FBRWR 2 38IN N ABSRB BLU L26.5MM 1/2

## (undated) DEVICE — PADDING,UNDERCAST,COTTON, 3X4YD STERILE: Brand: MEDLINE

## (undated) DEVICE — DISPOSABLE TOURNIQUET CUFF DUAL BLADDER, DUAL PORT AND QUICK CONNECT CONNECTOR: Brand: COLOR CUFF

## (undated) DEVICE — ENCORE® LATEX MICRO SIZE 8, STERILE LATEX POWDER-FREE SURGICAL GLOVE: Brand: ENCORE

## (undated) DEVICE — WEBRIL COTTON UNDERCAST PADDING: Brand: WEBRIL